# Patient Record
Sex: FEMALE | Race: WHITE | URBAN - METROPOLITAN AREA
[De-identification: names, ages, dates, MRNs, and addresses within clinical notes are randomized per-mention and may not be internally consistent; named-entity substitution may affect disease eponyms.]

---

## 2021-04-20 PROBLEM — M33.90 DERMATOMYOSITIS (HCC): Status: ACTIVE | Noted: 2021-04-20

## 2021-04-20 PROBLEM — F79 MENTAL IMPAIRMENT: Status: ACTIVE | Noted: 2021-04-20

## 2021-04-20 PROBLEM — E55.9 VITAMIN D DEFICIENCY: Status: ACTIVE | Noted: 2021-04-20

## 2021-04-20 PROBLEM — M33.13 DERMATOMYOSITIS (HCC): Status: ACTIVE | Noted: 2021-04-20

## 2021-04-20 NOTE — PROGRESS NOTES
Idalmis  is a 38year old female.   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

## 2021-05-03 ENCOUNTER — OFFICE VISIT (OUTPATIENT)
Dept: RHEUMATOLOGY | Facility: CLINIC | Age: 36
End: 2021-05-03
Payer: MEDICARE

## 2021-05-03 VITALS
DIASTOLIC BLOOD PRESSURE: 84 MMHG | WEIGHT: 118.13 LBS | SYSTOLIC BLOOD PRESSURE: 135 MMHG | HEART RATE: 91 BPM | BODY MASS INDEX: 21.74 KG/M2 | HEIGHT: 62 IN

## 2021-05-03 DIAGNOSIS — M33.90 DERMATOMYOSITIS (HCC): Primary | ICD-10-CM

## 2021-05-03 PROCEDURE — 99204 OFFICE O/P NEW MOD 45 MIN: CPT | Performed by: INTERNAL MEDICINE

## 2021-05-05 ENCOUNTER — LAB ENCOUNTER (OUTPATIENT)
Dept: LAB | Age: 36
End: 2021-05-05
Attending: INTERNAL MEDICINE
Payer: MEDICARE

## 2021-05-05 DIAGNOSIS — F79 MENTAL IMPAIRMENT: ICD-10-CM

## 2021-05-05 DIAGNOSIS — M33.90 DERMATOMYOSITIS (HCC): ICD-10-CM

## 2021-05-05 DIAGNOSIS — E55.9 VITAMIN D DEFICIENCY: ICD-10-CM

## 2021-05-05 DIAGNOSIS — Z13.6 ENCOUNTER FOR SCREENING FOR CORONARY ARTERY DISEASE: ICD-10-CM

## 2021-05-05 PROCEDURE — 82550 ASSAY OF CK (CPK): CPT

## 2021-05-05 PROCEDURE — 85027 COMPLETE CBC AUTOMATED: CPT

## 2021-05-05 PROCEDURE — 84443 ASSAY THYROID STIM HORMONE: CPT

## 2021-05-05 PROCEDURE — 36415 COLL VENOUS BLD VENIPUNCTURE: CPT

## 2021-05-05 PROCEDURE — 80061 LIPID PANEL: CPT

## 2021-05-05 PROCEDURE — 80053 COMPREHEN METABOLIC PANEL: CPT

## 2021-05-05 PROCEDURE — 82306 VITAMIN D 25 HYDROXY: CPT

## 2021-05-05 PROCEDURE — 82085 ASSAY OF ALDOLASE: CPT

## 2021-07-09 ENCOUNTER — OFFICE VISIT (OUTPATIENT)
Dept: OBGYN CLINIC | Facility: CLINIC | Age: 36
End: 2021-07-09
Payer: MEDICARE

## 2021-07-09 VITALS
WEIGHT: 116.63 LBS | HEART RATE: 86 BPM | DIASTOLIC BLOOD PRESSURE: 79 MMHG | SYSTOLIC BLOOD PRESSURE: 124 MMHG | BODY MASS INDEX: 21 KG/M2

## 2021-07-09 DIAGNOSIS — Z01.419 ENCOUNTER FOR GYNECOLOGICAL EXAMINATION WITHOUT ABNORMAL FINDING: Primary | ICD-10-CM

## 2021-07-09 PROCEDURE — G0101 CA SCREEN;PELVIC/BREAST EXAM: HCPCS | Performed by: OBSTETRICS & GYNECOLOGY

## 2021-07-09 NOTE — PROGRESS NOTES
Varinder Morrison is a 39year old female [facility] Patient's last menstrual period was 2021 (approximate). Patient presents with:  Gyn Exam: Early Luciano   .     Pt is mentally disabled and is here with her father.   Her m other recently  of breast denies shortness of breath  Gastrointestinal:  denies heartburn, abdominal pain, diarrhea or constipation  Genitourinary:  denies dysuria, incontinence, abnormal vaginal discharge, vaginal itching  Musculoskeletal:  denies back pain.   Skin/Breast:  Denies low risk and not sexually active.     Orders Placed This Encounter      Hpv Dna  High Risk , Thin Prep Collect      ThinPrep PAP Smear      Requested Prescriptions      No prescriptions requested or ordered in this encounter       None

## 2021-07-12 LAB — HPV I/H RISK 1 DNA SPEC QL NAA+PROBE: NEGATIVE

## 2021-10-11 ENCOUNTER — TELEPHONE (OUTPATIENT)
Dept: SCHEDULING | Age: 36
End: 2021-10-11

## 2021-10-12 ENCOUNTER — LAB SERVICES (OUTPATIENT)
Dept: URGENT CARE | Age: 36
End: 2021-10-12

## 2021-10-12 DIAGNOSIS — Z11.52 ENCOUNTER FOR SCREENING LABORATORY TESTING FOR COVID-19 VIRUS IN ASYMPTOMATIC PATIENT: Primary | ICD-10-CM

## 2021-10-12 DIAGNOSIS — Z01.812 ENCOUNTER FOR SCREENING LABORATORY TESTING FOR COVID-19 VIRUS IN ASYMPTOMATIC PATIENT: Primary | ICD-10-CM

## 2021-10-12 PROCEDURE — U0005 INFEC AGEN DETEC AMPLI PROBE: HCPCS | Performed by: CLINICAL MEDICAL LABORATORY

## 2021-10-12 PROCEDURE — U0003 INFECTIOUS AGENT DETECTION BY NUCLEIC ACID (DNA OR RNA); SEVERE ACUTE RESPIRATORY SYNDROME CORONAVIRUS 2 (SARS-COV-2) (CORONAVIRUS DISEASE [COVID-19]), AMPLIFIED PROBE TECHNIQUE, MAKING USE OF HIGH THROUGHPUT TECHNOLOGIES AS DESCRIBED BY CMS-2020-01-R: HCPCS | Performed by: CLINICAL MEDICAL LABORATORY

## 2021-10-13 LAB
SARS-COV-2 RNA RESP QL NAA+PROBE: NOT DETECTED
SERVICE CMNT-IMP: NORMAL
SERVICE CMNT-IMP: NORMAL

## 2021-12-13 ENCOUNTER — TELEPHONE (OUTPATIENT)
Dept: SCHEDULING | Age: 36
End: 2021-12-13

## 2021-12-14 ENCOUNTER — LAB SERVICES (OUTPATIENT)
Dept: URGENT CARE | Age: 36
End: 2021-12-14

## 2021-12-14 DIAGNOSIS — Z11.52 ENCOUNTER FOR SCREENING LABORATORY TESTING FOR COVID-19 VIRUS IN ASYMPTOMATIC PATIENT: Primary | ICD-10-CM

## 2021-12-14 DIAGNOSIS — Z01.812 ENCOUNTER FOR SCREENING LABORATORY TESTING FOR COVID-19 VIRUS IN ASYMPTOMATIC PATIENT: Primary | ICD-10-CM

## 2021-12-14 PROCEDURE — U0003 INFECTIOUS AGENT DETECTION BY NUCLEIC ACID (DNA OR RNA); SEVERE ACUTE RESPIRATORY SYNDROME CORONAVIRUS 2 (SARS-COV-2) (CORONAVIRUS DISEASE [COVID-19]), AMPLIFIED PROBE TECHNIQUE, MAKING USE OF HIGH THROUGHPUT TECHNOLOGIES AS DESCRIBED BY CMS-2020-01-R: HCPCS | Performed by: CLINICAL MEDICAL LABORATORY

## 2021-12-14 PROCEDURE — U0005 INFEC AGEN DETEC AMPLI PROBE: HCPCS | Performed by: CLINICAL MEDICAL LABORATORY

## 2021-12-15 LAB
SARS-COV-2 RNA RESP QL NAA+PROBE: NOT DETECTED
SERVICE CMNT-IMP: NORMAL
SERVICE CMNT-IMP: NORMAL

## 2022-04-06 ENCOUNTER — HOSPITAL ENCOUNTER (OUTPATIENT)
Dept: GENERAL RADIOLOGY | Facility: HOSPITAL | Age: 37
Discharge: HOME | End: 2022-04-06
Attending: INTERNAL MEDICINE
Payer: MEDICARE

## 2022-04-06 ENCOUNTER — OFFICE VISIT (OUTPATIENT)
Dept: INTERNAL MEDICINE CLINIC | Facility: CLINIC | Age: 37
End: 2022-04-06
Payer: MEDICARE

## 2022-04-06 VITALS
BODY MASS INDEX: 22.45 KG/M2 | SYSTOLIC BLOOD PRESSURE: 137 MMHG | DIASTOLIC BLOOD PRESSURE: 88 MMHG | WEIGHT: 122 LBS | HEART RATE: 74 BPM | RESPIRATION RATE: 16 BRPM | HEIGHT: 62 IN

## 2022-04-06 DIAGNOSIS — M79.602 LEFT ARM PAIN: ICD-10-CM

## 2022-04-06 DIAGNOSIS — Z80.0 FAMILY HISTORY OF COLON CANCER: ICD-10-CM

## 2022-04-06 DIAGNOSIS — E55.9 VITAMIN D DEFICIENCY: ICD-10-CM

## 2022-04-06 DIAGNOSIS — M25.512 CHRONIC LEFT SHOULDER PAIN: ICD-10-CM

## 2022-04-06 DIAGNOSIS — G89.29 CHRONIC LEFT SHOULDER PAIN: Primary | ICD-10-CM

## 2022-04-06 DIAGNOSIS — G89.29 CHRONIC LEFT SHOULDER PAIN: ICD-10-CM

## 2022-04-06 DIAGNOSIS — M25.512 CHRONIC LEFT SHOULDER PAIN: Primary | ICD-10-CM

## 2022-04-06 PROCEDURE — 99214 OFFICE O/P EST MOD 30 MIN: CPT | Performed by: INTERNAL MEDICINE

## 2022-04-06 PROCEDURE — 73060 X-RAY EXAM OF HUMERUS: CPT | Performed by: INTERNAL MEDICINE

## 2022-04-06 PROCEDURE — 73030 X-RAY EXAM OF SHOULDER: CPT | Performed by: INTERNAL MEDICINE

## 2022-04-06 RX ORDER — LIDOCAINE 50 MG/G
1 PATCH TOPICAL EVERY 24 HOURS
Qty: 15 EACH | Refills: 0 | Status: SHIPPED | OUTPATIENT
Start: 2022-04-06

## 2022-04-06 RX ORDER — ERGOCALCIFEROL 1.25 MG/1
50000 CAPSULE ORAL WEEKLY
Qty: 4 CAPSULE | Refills: 3 | Status: SHIPPED | OUTPATIENT
Start: 2022-04-06

## 2022-06-01 ENCOUNTER — TELEPHONE (OUTPATIENT)
Dept: SCHEDULING | Age: 37
End: 2022-06-01

## 2022-06-01 ENCOUNTER — OFFICE VISIT (OUTPATIENT)
Dept: URGENT CARE | Age: 37
End: 2022-06-01

## 2022-06-01 VITALS
DIASTOLIC BLOOD PRESSURE: 82 MMHG | BODY MASS INDEX: 21.16 KG/M2 | WEIGHT: 115 LBS | HEIGHT: 62 IN | HEART RATE: 94 BPM | SYSTOLIC BLOOD PRESSURE: 100 MMHG | RESPIRATION RATE: 20 BRPM | OXYGEN SATURATION: 95 % | TEMPERATURE: 98.3 F

## 2022-06-01 DIAGNOSIS — J02.0 ACUTE STREPTOCOCCAL PHARYNGITIS: Primary | ICD-10-CM

## 2022-06-01 DIAGNOSIS — J06.9 VIRAL UPPER RESPIRATORY TRACT INFECTION: ICD-10-CM

## 2022-06-01 DIAGNOSIS — J02.9 SORE THROAT: ICD-10-CM

## 2022-06-01 LAB
INTERNAL PROCEDURAL CONTROLS ACCEPTABLE: YES
S PYO AG THROAT QL IA.RAPID: POSITIVE
TEST LOT EXPIRATION DATE: ABNORMAL
TEST LOT NUMBER: ABNORMAL

## 2022-06-01 PROCEDURE — 87880 STREP A ASSAY W/OPTIC: CPT | Performed by: NURSE PRACTITIONER

## 2022-06-01 PROCEDURE — U0005 INFEC AGEN DETEC AMPLI PROBE: HCPCS | Performed by: CLINICAL MEDICAL LABORATORY

## 2022-06-01 PROCEDURE — U0003 INFECTIOUS AGENT DETECTION BY NUCLEIC ACID (DNA OR RNA); SEVERE ACUTE RESPIRATORY SYNDROME CORONAVIRUS 2 (SARS-COV-2) (CORONAVIRUS DISEASE [COVID-19]), AMPLIFIED PROBE TECHNIQUE, MAKING USE OF HIGH THROUGHPUT TECHNOLOGIES AS DESCRIBED BY CMS-2020-01-R: HCPCS | Performed by: CLINICAL MEDICAL LABORATORY

## 2022-06-01 PROCEDURE — 99203 OFFICE O/P NEW LOW 30 MIN: CPT | Performed by: NURSE PRACTITIONER

## 2022-06-01 RX ORDER — AMOXICILLIN 875 MG/1
875 TABLET, COATED ORAL 2 TIMES DAILY
Qty: 20 TABLET | Refills: 0 | Status: SHIPPED | OUTPATIENT
Start: 2022-06-01 | End: 2022-06-11

## 2022-06-01 RX ORDER — LIDOCAINE 50 MG/G
1 PATCH TOPICAL DAILY
COMMUNITY
Start: 2022-04-06

## 2022-06-01 ASSESSMENT — ENCOUNTER SYMPTOMS
COUGH: 1
FEVER: 0
CHILLS: 0
WHEEZING: 0
HEADACHES: 0
DIARRHEA: 0
SORE THROAT: 1
SHORTNESS OF BREATH: 0
TROUBLE SWALLOWING: 0
SWOLLEN GLANDS: 0

## 2022-06-02 ENCOUNTER — TELEPHONE (OUTPATIENT)
Dept: URGENT CARE | Age: 37
End: 2022-06-02

## 2022-06-02 LAB
SARS-COV-2 RNA RESP QL NAA+PROBE: DETECTED
SERVICE CMNT-IMP: ABNORMAL

## 2022-07-11 ENCOUNTER — LAB ENCOUNTER (OUTPATIENT)
Dept: LAB | Age: 37
End: 2022-07-11
Attending: INTERNAL MEDICINE
Payer: MEDICARE

## 2022-07-11 ENCOUNTER — OFFICE VISIT (OUTPATIENT)
Dept: INTERNAL MEDICINE CLINIC | Facility: CLINIC | Age: 37
End: 2022-07-11
Payer: MEDICARE

## 2022-07-11 VITALS
DIASTOLIC BLOOD PRESSURE: 50 MMHG | SYSTOLIC BLOOD PRESSURE: 114 MMHG | BODY MASS INDEX: 21.35 KG/M2 | HEART RATE: 85 BPM | WEIGHT: 116 LBS | HEIGHT: 62 IN

## 2022-07-11 DIAGNOSIS — H61.21 RIGHT EAR IMPACTED CERUMEN: ICD-10-CM

## 2022-07-11 DIAGNOSIS — F79 MENTAL IMPAIRMENT: ICD-10-CM

## 2022-07-11 DIAGNOSIS — M33.90 DERMATOMYOSITIS (HCC): ICD-10-CM

## 2022-07-11 DIAGNOSIS — Z00.00 ENCOUNTER FOR ANNUAL HEALTH EXAMINATION: Primary | ICD-10-CM

## 2022-07-11 DIAGNOSIS — Z83.42 FAMILY HISTORY OF FAMILIAL HYPERCHOLESTEROLEMIA: ICD-10-CM

## 2022-07-11 DIAGNOSIS — E55.9 VITAMIN D DEFICIENCY: ICD-10-CM

## 2022-07-11 DIAGNOSIS — Z00.00 ENCOUNTER FOR ANNUAL HEALTH EXAMINATION: ICD-10-CM

## 2022-07-11 LAB
ALBUMIN SERPL-MCNC: 3.7 G/DL (ref 3.4–5)
ALBUMIN/GLOB SERPL: 0.9 {RATIO} (ref 1–2)
ALP LIVER SERPL-CCNC: 84 U/L
ALT SERPL-CCNC: 15 U/L
ANION GAP SERPL CALC-SCNC: 9 MMOL/L (ref 0–18)
AST SERPL-CCNC: 10 U/L (ref 15–37)
BILIRUB SERPL-MCNC: 0.2 MG/DL (ref 0.1–2)
BUN BLD-MCNC: 13 MG/DL (ref 7–18)
BUN/CREAT SERPL: 18.3 (ref 10–20)
CALCIUM BLD-MCNC: 9.4 MG/DL (ref 8.5–10.1)
CHLORIDE SERPL-SCNC: 107 MMOL/L (ref 98–112)
CHOLEST SERPL-MCNC: 215 MG/DL (ref ?–200)
CO2 SERPL-SCNC: 26 MMOL/L (ref 21–32)
CREAT BLD-MCNC: 0.71 MG/DL
DEPRECATED RDW RBC AUTO: 47.4 FL (ref 35.1–46.3)
ERYTHROCYTE [DISTWIDTH] IN BLOOD BY AUTOMATED COUNT: 13.7 % (ref 11–15)
FASTING PATIENT LIPID ANSWER: YES
FASTING STATUS PATIENT QL REPORTED: YES
GLOBULIN PLAS-MCNC: 4.1 G/DL (ref 2.8–4.4)
GLUCOSE BLD-MCNC: 95 MG/DL (ref 70–99)
HCT VFR BLD AUTO: 39.9 %
HDLC SERPL-MCNC: 46 MG/DL (ref 40–59)
HGB BLD-MCNC: 12.6 G/DL
LDLC SERPL CALC-MCNC: 153 MG/DL (ref ?–100)
MCH RBC QN AUTO: 29.9 PG (ref 26–34)
MCHC RBC AUTO-ENTMCNC: 31.6 G/DL (ref 31–37)
MCV RBC AUTO: 94.8 FL
NONHDLC SERPL-MCNC: 169 MG/DL (ref ?–130)
OSMOLALITY SERPL CALC.SUM OF ELEC: 294 MOSM/KG (ref 275–295)
PLATELET # BLD AUTO: 379 10(3)UL (ref 150–450)
POTASSIUM SERPL-SCNC: 4.1 MMOL/L (ref 3.5–5.1)
PROT SERPL-MCNC: 7.8 G/DL (ref 6.4–8.2)
RBC # BLD AUTO: 4.21 X10(6)UL
SODIUM SERPL-SCNC: 142 MMOL/L (ref 136–145)
TRIGL SERPL-MCNC: 87 MG/DL (ref 30–149)
TSI SER-ACNC: 0.39 MIU/ML (ref 0.36–3.74)
VIT D+METAB SERPL-MCNC: 11.9 NG/ML (ref 30–100)
VLDLC SERPL CALC-MCNC: 17 MG/DL (ref 0–30)
WBC # BLD AUTO: 8.3 X10(3) UL (ref 4–11)

## 2022-07-11 PROCEDURE — 82306 VITAMIN D 25 HYDROXY: CPT

## 2022-07-11 PROCEDURE — 84443 ASSAY THYROID STIM HORMONE: CPT

## 2022-07-11 PROCEDURE — G0439 PPPS, SUBSEQ VISIT: HCPCS | Performed by: INTERNAL MEDICINE

## 2022-07-11 PROCEDURE — 80053 COMPREHEN METABOLIC PANEL: CPT

## 2022-07-11 PROCEDURE — 36415 COLL VENOUS BLD VENIPUNCTURE: CPT

## 2022-07-11 PROCEDURE — 85027 COMPLETE CBC AUTOMATED: CPT

## 2022-07-11 PROCEDURE — 80061 LIPID PANEL: CPT

## 2022-07-13 RX ORDER — ERGOCALCIFEROL 1.25 MG/1
50000 CAPSULE ORAL WEEKLY
Qty: 4 CAPSULE | Refills: 3 | Status: SHIPPED | OUTPATIENT
Start: 2022-07-13

## 2022-07-16 ENCOUNTER — IMAGING SERVICES (OUTPATIENT)
Dept: GENERAL RADIOLOGY | Age: 37
End: 2022-07-16
Attending: NURSE PRACTITIONER

## 2022-07-16 ENCOUNTER — WALK IN (OUTPATIENT)
Dept: URGENT CARE | Age: 37
End: 2022-07-16

## 2022-07-16 VITALS
SYSTOLIC BLOOD PRESSURE: 137 MMHG | HEART RATE: 85 BPM | TEMPERATURE: 98.3 F | DIASTOLIC BLOOD PRESSURE: 82 MMHG | OXYGEN SATURATION: 97 % | BODY MASS INDEX: 21.28 KG/M2 | HEIGHT: 62 IN | WEIGHT: 115.63 LBS

## 2022-07-16 DIAGNOSIS — S99.921A INJURY OF TOE ON RIGHT FOOT, INITIAL ENCOUNTER: ICD-10-CM

## 2022-07-16 DIAGNOSIS — S99.921A INJURY OF TOE ON RIGHT FOOT, INITIAL ENCOUNTER: Primary | ICD-10-CM

## 2022-07-16 DIAGNOSIS — S90.121A CONTUSION OF FIFTH TOE OF RIGHT FOOT, INITIAL ENCOUNTER: ICD-10-CM

## 2022-07-16 PROCEDURE — 73630 X-RAY EXAM OF FOOT: CPT | Performed by: NURSE PRACTITIONER

## 2022-07-16 PROCEDURE — 99213 OFFICE O/P EST LOW 20 MIN: CPT | Performed by: NURSE PRACTITIONER

## 2022-07-16 ASSESSMENT — ENCOUNTER SYMPTOMS
CHEST TIGHTNESS: 0
FEVER: 0
RESPIRATORY NEGATIVE: 1
LOSS OF SENSATION: 0
SHORTNESS OF BREATH: 0
GASTROINTESTINAL NEGATIVE: 1
SINUS PAIN: 0
HEADACHES: 0
CONSTITUTIONAL NEGATIVE: 1
SINUS PRESSURE: 0
EYES NEGATIVE: 1
HEMATOLOGIC/LYMPHATIC NEGATIVE: 1
WHEEZING: 0
COLOR CHANGE: 0
TINGLING: 0
ABDOMINAL DISTENTION: 0
ENDOCRINE NEGATIVE: 1
WEAKNESS: 0
VOICE CHANGE: 0
FATIGUE: 0
PSYCHIATRIC NEGATIVE: 1
NUMBNESS: 0
DIZZINESS: 0
STRIDOR: 0
SORE THROAT: 0
COUGH: 0
RHINORRHEA: 0
ALLERGIC/IMMUNOLOGIC NEGATIVE: 1
ACTIVITY CHANGE: 0
TROUBLE SWALLOWING: 0

## 2022-09-19 ENCOUNTER — NURSE TRIAGE (OUTPATIENT)
Dept: INTERNAL MEDICINE CLINIC | Facility: CLINIC | Age: 37
End: 2022-09-19

## 2022-09-19 ENCOUNTER — OFFICE VISIT (OUTPATIENT)
Dept: INTERNAL MEDICINE CLINIC | Facility: CLINIC | Age: 37
End: 2022-09-19
Payer: MEDICARE

## 2022-09-19 VITALS
DIASTOLIC BLOOD PRESSURE: 74 MMHG | HEART RATE: 86 BPM | WEIGHT: 119 LBS | RESPIRATION RATE: 16 BRPM | SYSTOLIC BLOOD PRESSURE: 110 MMHG | BODY MASS INDEX: 21.9 KG/M2 | HEIGHT: 62 IN

## 2022-09-19 DIAGNOSIS — Z12.11 COLON CANCER SCREENING: Primary | ICD-10-CM

## 2022-09-19 DIAGNOSIS — K62.5 RECTAL BLEEDING: ICD-10-CM

## 2022-09-19 DIAGNOSIS — Z80.0 FAMILY HISTORY OF COLON CANCER: ICD-10-CM

## 2022-09-19 NOTE — PATIENT INSTRUCTIONS
Increase fiber intake    Increase water intake     Take colace or a stool softner as needed for constipation

## 2022-12-03 ENCOUNTER — OFFICE VISIT (OUTPATIENT)
Dept: INTERNAL MEDICINE CLINIC | Facility: CLINIC | Age: 37
End: 2022-12-03
Payer: MEDICARE

## 2022-12-03 VITALS
DIASTOLIC BLOOD PRESSURE: 80 MMHG | WEIGHT: 127 LBS | BODY MASS INDEX: 23.37 KG/M2 | HEART RATE: 90 BPM | TEMPERATURE: 99 F | HEIGHT: 62 IN | OXYGEN SATURATION: 95 % | SYSTOLIC BLOOD PRESSURE: 118 MMHG

## 2022-12-03 DIAGNOSIS — F79 MENTAL IMPAIRMENT: ICD-10-CM

## 2022-12-03 DIAGNOSIS — Z02.5 SPORTS PHYSICAL: Primary | ICD-10-CM

## 2023-02-09 ENCOUNTER — OFFICE VISIT (OUTPATIENT)
Dept: INTERNAL MEDICINE CLINIC | Facility: CLINIC | Age: 38
End: 2023-02-09

## 2023-02-09 VITALS
HEIGHT: 62 IN | WEIGHT: 126 LBS | OXYGEN SATURATION: 97 % | SYSTOLIC BLOOD PRESSURE: 110 MMHG | BODY MASS INDEX: 23.19 KG/M2 | DIASTOLIC BLOOD PRESSURE: 70 MMHG | HEART RATE: 67 BPM

## 2023-02-09 DIAGNOSIS — Z80.3 FAMILY HISTORY OF BREAST CANCER: ICD-10-CM

## 2023-02-09 DIAGNOSIS — N64.4 BREAST PAIN, LEFT: Primary | ICD-10-CM

## 2023-02-09 PROCEDURE — 99214 OFFICE O/P EST MOD 30 MIN: CPT | Performed by: PHYSICIAN ASSISTANT

## 2023-02-15 ENCOUNTER — TELEPHONE (OUTPATIENT)
Dept: INTERNAL MEDICINE CLINIC | Facility: CLINIC | Age: 38
End: 2023-02-15

## 2023-02-15 ENCOUNTER — OFFICE VISIT (OUTPATIENT)
Dept: INTERNAL MEDICINE CLINIC | Facility: CLINIC | Age: 38
End: 2023-02-15

## 2023-02-15 VITALS
RESPIRATION RATE: 18 BRPM | TEMPERATURE: 99 F | BODY MASS INDEX: 23.37 KG/M2 | SYSTOLIC BLOOD PRESSURE: 112 MMHG | DIASTOLIC BLOOD PRESSURE: 68 MMHG | OXYGEN SATURATION: 99 % | WEIGHT: 127 LBS | HEART RATE: 74 BPM | HEIGHT: 62 IN

## 2023-02-15 DIAGNOSIS — N64.4 BREAST PAIN, LEFT: Primary | ICD-10-CM

## 2023-02-15 PROCEDURE — 99213 OFFICE O/P EST LOW 20 MIN: CPT | Performed by: PHYSICIAN ASSISTANT

## 2023-02-15 RX ORDER — SULFAMETHOXAZOLE AND TRIMETHOPRIM 800; 160 MG/1; MG/1
1 TABLET ORAL 2 TIMES DAILY
Qty: 6 TABLET | Refills: 0 | Status: SHIPPED | OUTPATIENT
Start: 2023-02-15 | End: 2023-02-18

## 2023-02-15 NOTE — TELEPHONE ENCOUNTER
D/w PA who saw pt --did not look red and only mid discomfort at that time , if it has gotten worse if might not be a bad idea to get it looked at [facility] -- RENETTA smith is here today and has openings and I ma here as well to pop my head in --pls see if they are avail to come in today

## 2023-02-15 NOTE — TELEPHONE ENCOUNTER
Patient father  calling  On ESTRELLA ( identified name and  )  Patient is having left  breast pain where breast lump is located    Last office visit 2023    Has been taking Advil with no relief     Area is very tender to touch, patient is not very verbal and is expressing pain to her dad      ( Father concerned as patient's  mother  of breast cancer )     Has Mammogram next week,father asking for short term pain med until then    Allergies reviewed and pharmacy confirmed    Please advise and thank you.       Future Appointments   Date Time Provider Evangelina Dumont   2023 10:40 AM [facility] [address] [facility] EM [address]   2023 11:00 AM Michelle Leigh MD [facility]             Staff-  please call Brad East  at  [phone] with provider response

## 2023-02-15 NOTE — TELEPHONE ENCOUNTER
Spoke with patient father Anita Berry , (  Name and  verified ) informed of 's  instructions below    Future Appointments   Date Time Provider Evangelina Dumont   2/15/2023  4:20 PM Yao Currie [facility]   2023 10:40 AM [facility]   2023 11:00 AM Vicenta Rosen MD [facility]

## 2023-02-22 ENCOUNTER — HOSPITAL ENCOUNTER (OUTPATIENT)
Dept: MAMMOGRAPHY | Facility: HOSPITAL | Age: 38
Discharge: HOME | End: 2023-02-22
Attending: PHYSICIAN ASSISTANT
Payer: MEDICARE

## 2023-02-22 ENCOUNTER — HOSPITAL ENCOUNTER (OUTPATIENT)
Dept: ULTRASOUND IMAGING | Facility: HOSPITAL | Age: 38
Discharge: HOME | End: 2023-02-22
Attending: PHYSICIAN ASSISTANT
Payer: MEDICARE

## 2023-02-22 DIAGNOSIS — N64.4 BREAST PAIN, LEFT: ICD-10-CM

## 2023-02-22 PROCEDURE — 77062 BREAST TOMOSYNTHESIS BI: CPT | Performed by: PHYSICIAN ASSISTANT

## 2023-02-22 PROCEDURE — 77066 DX MAMMO INCL CAD BI: CPT | Performed by: PHYSICIAN ASSISTANT

## 2023-02-22 PROCEDURE — 76642 ULTRASOUND BREAST LIMITED: CPT | Performed by: PHYSICIAN ASSISTANT

## 2023-02-23 ENCOUNTER — TELEPHONE (OUTPATIENT)
Dept: INTERNAL MEDICINE CLINIC | Facility: CLINIC | Age: 38
End: 2023-02-23

## 2023-02-23 DIAGNOSIS — R92.8 ABNORMAL MAMMOGRAM: Primary | ICD-10-CM

## 2023-02-23 NOTE — TELEPHONE ENCOUNTER
JORGE JACKSON:  Please note, father called for results. He seems worried only because patient is special needs and during the mammogram procedures, he was not allowed in the exam room. He would like to be notified with the results. He is on ESTRELLA.

## 2023-05-03 ENCOUNTER — TELEPHONE (OUTPATIENT)
Dept: INTERNAL MEDICINE CLINIC | Facility: CLINIC | Age: 38
End: 2023-05-03

## 2023-05-03 NOTE — TELEPHONE ENCOUNTER
Looked in CE - not able to see labs --pls assist with apt - telemedicine is ok if dad is ok with that

## 2023-05-03 NOTE — TELEPHONE ENCOUNTER
Call saida Brush whether Dr Mariane Cranker can see ER test results from 4-29 @ [facility].

## 2023-05-05 ENCOUNTER — TELEPHONE (OUTPATIENT)
Dept: INTERNAL MEDICINE CLINIC | Facility: CLINIC | Age: 38
End: 2023-05-05

## 2023-05-05 ENCOUNTER — MED REC SCAN ONLY (OUTPATIENT)
Dept: INTERNAL MEDICINE CLINIC | Facility: CLINIC | Age: 38
End: 2023-05-05

## 2023-05-05 NOTE — TELEPHONE ENCOUNTER
Laxmi Sparrow from [employer] states she faxed patients medical records asking if office received them. Please call back to confirm if received.     Ph# [phone]

## 2023-05-09 ENCOUNTER — OFFICE VISIT (OUTPATIENT)
Dept: INTERNAL MEDICINE CLINIC | Facility: CLINIC | Age: 38
End: 2023-05-09

## 2023-05-09 ENCOUNTER — LAB ENCOUNTER (OUTPATIENT)
Dept: LAB | Age: 38
End: 2023-05-09
Attending: INTERNAL MEDICINE
Payer: MEDICARE

## 2023-05-09 VITALS
WEIGHT: 123.81 LBS | DIASTOLIC BLOOD PRESSURE: 62 MMHG | HEART RATE: 84 BPM | SYSTOLIC BLOOD PRESSURE: 108 MMHG | RESPIRATION RATE: 18 BRPM | OXYGEN SATURATION: 100 % | BODY MASS INDEX: 22.78 KG/M2 | HEIGHT: 62 IN

## 2023-05-09 DIAGNOSIS — Z83.42 FAMILY HISTORY OF FAMILIAL HYPERCHOLESTEROLEMIA: ICD-10-CM

## 2023-05-09 DIAGNOSIS — M33.90 DERMATOMYOSITIS (HCC): ICD-10-CM

## 2023-05-09 DIAGNOSIS — F79 MENTAL IMPAIRMENT: ICD-10-CM

## 2023-05-09 DIAGNOSIS — E55.9 VITAMIN D DEFICIENCY: ICD-10-CM

## 2023-05-09 DIAGNOSIS — I77.1 CELIAC ARTERY STENOSIS (HCC): Primary | ICD-10-CM

## 2023-05-09 PROBLEM — Z80.3 FAMILY HISTORY OF BREAST CANCER IN MOTHER: Status: ACTIVE | Noted: 2023-05-09

## 2023-05-09 LAB
CHOLEST SERPL-MCNC: 245 MG/DL (ref ?–200)
FASTING PATIENT LIPID ANSWER: YES
HDLC SERPL-MCNC: 50 MG/DL (ref 40–59)
LDLC SERPL CALC-MCNC: 169 MG/DL (ref ?–100)
NONHDLC SERPL-MCNC: 195 MG/DL (ref ?–130)
TRIGL SERPL-MCNC: 141 MG/DL (ref 30–149)
TSI SER-ACNC: 0.48 MIU/ML (ref 0.36–3.74)
VIT D+METAB SERPL-MCNC: 10.8 NG/ML (ref 30–100)
VLDLC SERPL CALC-MCNC: 28 MG/DL (ref 0–30)

## 2023-05-09 PROCEDURE — 84443 ASSAY THYROID STIM HORMONE: CPT

## 2023-05-09 PROCEDURE — 80061 LIPID PANEL: CPT

## 2023-05-09 PROCEDURE — 99214 OFFICE O/P EST MOD 30 MIN: CPT | Performed by: INTERNAL MEDICINE

## 2023-05-09 PROCEDURE — 82306 VITAMIN D 25 HYDROXY: CPT

## 2023-05-09 PROCEDURE — 36415 COLL VENOUS BLD VENIPUNCTURE: CPT

## 2023-05-09 RX ORDER — ERGOCALCIFEROL 1.25 MG/1
50000 CAPSULE ORAL WEEKLY
Qty: 4 CAPSULE | Refills: 3 | Status: CANCELLED | OUTPATIENT
Start: 2023-05-09

## 2023-05-14 DIAGNOSIS — E78.2 MIXED HYPERLIPIDEMIA: Primary | ICD-10-CM

## 2023-05-14 RX ORDER — ERGOCALCIFEROL 1.25 MG/1
50000 CAPSULE ORAL WEEKLY
Qty: 4 CAPSULE | Refills: 3 | Status: SHIPPED | OUTPATIENT
Start: 2023-05-14

## 2023-05-14 RX ORDER — ATORVASTATIN CALCIUM 20 MG/1
20 TABLET, FILM COATED ORAL NIGHTLY
Qty: 90 TABLET | Refills: 3 | Status: SHIPPED | OUTPATIENT
Start: 2023-05-14

## 2023-06-14 ENCOUNTER — OFFICE VISIT (OUTPATIENT)
Dept: SURGERY | Facility: CLINIC | Age: 38
End: 2023-06-14
Payer: MEDICARE

## 2023-06-14 VITALS
OXYGEN SATURATION: 99 % | HEART RATE: 73 BPM | SYSTOLIC BLOOD PRESSURE: 136 MMHG | RESPIRATION RATE: 16 BRPM | DIASTOLIC BLOOD PRESSURE: 85 MMHG

## 2023-06-14 DIAGNOSIS — R92.8 ABNORMAL MAMMOGRAM OF RIGHT BREAST: Primary | ICD-10-CM

## 2023-06-14 PROCEDURE — 99204 OFFICE O/P NEW MOD 45 MIN: CPT | Performed by: SURGERY

## 2023-06-16 PROBLEM — R92.8 ABNORMAL MAMMOGRAM OF RIGHT BREAST: Status: ACTIVE | Noted: 2023-06-16

## 2023-07-31 ENCOUNTER — HOSPITAL ENCOUNTER (EMERGENCY)
Facility: HOSPITAL | Age: 38
Discharge: HOME | End: 2023-07-31
Attending: EMERGENCY MEDICINE
Payer: MEDICARE

## 2023-07-31 VITALS
HEART RATE: 88 BPM | OXYGEN SATURATION: 100 % | TEMPERATURE: 98 F | DIASTOLIC BLOOD PRESSURE: 75 MMHG | RESPIRATION RATE: 18 BRPM | SYSTOLIC BLOOD PRESSURE: 131 MMHG

## 2023-07-31 DIAGNOSIS — R11.2 NAUSEA AND VOMITING IN ADULT: Primary | ICD-10-CM

## 2023-07-31 LAB
ALBUMIN SERPL-MCNC: 3.9 G/DL (ref 3.4–5)
ALBUMIN/GLOB SERPL: 0.9 {RATIO} (ref 1–2)
ALP LIVER SERPL-CCNC: 80 U/L
ALT SERPL-CCNC: 20 U/L
ANION GAP SERPL CALC-SCNC: 9 MMOL/L (ref 0–18)
AST SERPL-CCNC: 19 U/L (ref 15–37)
BASOPHILS # BLD AUTO: 0.02 X10(3) UL (ref 0–0.2)
BASOPHILS NFR BLD AUTO: 0.1 %
BILIRUB SERPL-MCNC: 0.3 MG/DL (ref 0.1–2)
BUN BLD-MCNC: 7 MG/DL (ref 7–18)
BUN/CREAT SERPL: 9 (ref 10–20)
CALCIUM BLD-MCNC: 9.3 MG/DL (ref 8.5–10.1)
CHLORIDE SERPL-SCNC: 108 MMOL/L (ref 98–112)
CO2 SERPL-SCNC: 24 MMOL/L (ref 21–32)
CREAT BLD-MCNC: 0.78 MG/DL
DEPRECATED RDW RBC AUTO: 45.6 FL (ref 35.1–46.3)
EGFRCR SERPLBLD CKD-EPI 2021: 99 ML/MIN/1.73M2 (ref 60–?)
EOSINOPHIL # BLD AUTO: 0.03 X10(3) UL (ref 0–0.7)
EOSINOPHIL NFR BLD AUTO: 0.2 %
ERYTHROCYTE [DISTWIDTH] IN BLOOD BY AUTOMATED COUNT: 14.2 % (ref 11–15)
GLOBULIN PLAS-MCNC: 4.2 G/DL (ref 2.8–4.4)
GLUCOSE BLD-MCNC: 136 MG/DL (ref 70–99)
HCT VFR BLD AUTO: 36.8 %
HGB BLD-MCNC: 12 G/DL
IMM GRANULOCYTES # BLD AUTO: 0.06 X10(3) UL (ref 0–1)
IMM GRANULOCYTES NFR BLD: 0.4 %
LIPASE SERPL-CCNC: 40 U/L (ref 13–75)
LYMPHOCYTES # BLD AUTO: 2.63 X10(3) UL (ref 1–4)
LYMPHOCYTES NFR BLD AUTO: 16.2 %
MCH RBC QN AUTO: 29 PG (ref 26–34)
MCHC RBC AUTO-ENTMCNC: 32.6 G/DL (ref 31–37)
MCV RBC AUTO: 88.9 FL
MONOCYTES # BLD AUTO: 0.89 X10(3) UL (ref 0.1–1)
MONOCYTES NFR BLD AUTO: 5.5 %
NEUTROPHILS # BLD AUTO: 12.59 X10 (3) UL (ref 1.5–7.7)
NEUTROPHILS # BLD AUTO: 12.59 X10(3) UL (ref 1.5–7.7)
NEUTROPHILS NFR BLD AUTO: 77.6 %
OSMOLALITY SERPL CALC.SUM OF ELEC: 292 MOSM/KG (ref 275–295)
PLATELET # BLD AUTO: 333 10(3)UL (ref 150–450)
POTASSIUM SERPL-SCNC: 3.6 MMOL/L (ref 3.5–5.1)
PROT SERPL-MCNC: 8.1 G/DL (ref 6.4–8.2)
RBC # BLD AUTO: 4.14 X10(6)UL
SODIUM SERPL-SCNC: 141 MMOL/L (ref 136–145)
WBC # BLD AUTO: 16.2 X10(3) UL (ref 4–11)

## 2023-07-31 PROCEDURE — 80053 COMPREHEN METABOLIC PANEL: CPT | Performed by: EMERGENCY MEDICINE

## 2023-07-31 PROCEDURE — S0028 INJECTION, FAMOTIDINE, 20 MG: HCPCS | Performed by: EMERGENCY MEDICINE

## 2023-07-31 PROCEDURE — 96361 HYDRATE IV INFUSION ADD-ON: CPT

## 2023-07-31 PROCEDURE — 96375 TX/PRO/DX INJ NEW DRUG ADDON: CPT

## 2023-07-31 PROCEDURE — 85025 COMPLETE CBC W/AUTO DIFF WBC: CPT | Performed by: EMERGENCY MEDICINE

## 2023-07-31 PROCEDURE — 83690 ASSAY OF LIPASE: CPT | Performed by: EMERGENCY MEDICINE

## 2023-07-31 PROCEDURE — 96374 THER/PROPH/DIAG INJ IV PUSH: CPT

## 2023-07-31 PROCEDURE — 99284 EMERGENCY DEPT VISIT MOD MDM: CPT

## 2023-07-31 RX ORDER — FAMOTIDINE 10 MG/ML
20 INJECTION, SOLUTION INTRAVENOUS ONCE
Status: COMPLETED | OUTPATIENT
Start: 2023-07-31 | End: 2023-07-31

## 2023-07-31 RX ORDER — ONDANSETRON 4 MG/1
4 TABLET, ORALLY DISINTEGRATING ORAL EVERY 8 HOURS PRN
Qty: 30 TABLET | Refills: 0 | Status: SHIPPED | OUTPATIENT
Start: 2023-07-31 | End: 2023-08-07

## 2023-07-31 RX ORDER — ONDANSETRON 2 MG/ML
4 INJECTION INTRAMUSCULAR; INTRAVENOUS ONCE
Status: COMPLETED | OUTPATIENT
Start: 2023-07-31 | End: 2023-07-31

## 2023-07-31 NOTE — ED INITIAL ASSESSMENT (HPI)
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 Pt is seeing dr. Jd Cheatham and had a ct angiography of her mesenteric arteries last month and is scheduled for an ultrasound on 8/3. Pt is having abdominal pain and vomiting that started this evening. B: see above    A: pt is non toxic appearing.  There appears to be some developmental delays    R: protocol

## 2023-08-02 ENCOUNTER — PATIENT OUTREACH (OUTPATIENT)
Dept: CASE MANAGEMENT | Age: 38
End: 2023-08-02

## 2023-08-02 NOTE — PROGRESS NOTES
1st attempt ED f/up apt request   No answer, LVMTCB to schedule apts Bilobed Transposition Flap Text: The defect edges were debeveled with a #15 scalpel blade.  Given the location of the defect and the proximity to free margins a bilobed transposition flap was deemed most appropriate.  Using a sterile surgical marker, an appropriate bilobe flap drawn around the defect.    The area thus outlined was incised deep to adipose tissue with a #15 scalpel blade.  The skin margins were undermined to an appropriate distance in all directions utilizing iris scissors.

## 2023-08-04 NOTE — PROGRESS NOTES
3rd attempt ED f/up apt request     Spoke to pts father Troy Ty and he would like to wait till pt has her other apts before scheduling w/her PCP.

## 2023-08-16 ENCOUNTER — OFFICE VISIT (OUTPATIENT)
Dept: INTERNAL MEDICINE CLINIC | Facility: CLINIC | Age: 38
End: 2023-08-16

## 2023-08-16 VITALS
DIASTOLIC BLOOD PRESSURE: 58 MMHG | SYSTOLIC BLOOD PRESSURE: 116 MMHG | WEIGHT: 130 LBS | HEART RATE: 74 BPM | HEIGHT: 62 IN | BODY MASS INDEX: 23.92 KG/M2 | OXYGEN SATURATION: 100 %

## 2023-08-16 DIAGNOSIS — R11.0 NAUSEA: Primary | ICD-10-CM

## 2023-08-16 DIAGNOSIS — K21.9 GASTROESOPHAGEAL REFLUX DISEASE, UNSPECIFIED WHETHER ESOPHAGITIS PRESENT: ICD-10-CM

## 2023-08-16 PROCEDURE — 99213 OFFICE O/P EST LOW 20 MIN: CPT

## 2023-08-16 RX ORDER — ONDANSETRON 4 MG/1
4 TABLET, ORALLY DISINTEGRATING ORAL EVERY 8 HOURS PRN
Qty: 30 TABLET | Refills: 0 | Status: SHIPPED | OUTPATIENT
Start: 2023-08-16

## 2023-08-16 RX ORDER — ONDANSETRON 4 MG/1
4 TABLET, ORALLY DISINTEGRATING ORAL EVERY 8 HOURS PRN
COMMUNITY
End: 2023-08-16

## 2023-08-23 ENCOUNTER — HOSPITAL ENCOUNTER (OUTPATIENT)
Dept: MAMMOGRAPHY | Facility: HOSPITAL | Age: 38
Discharge: HOME | End: 2023-08-23
Attending: SURGERY
Payer: MEDICARE

## 2023-08-23 DIAGNOSIS — R92.8 ABNORMAL MAMMOGRAM OF RIGHT BREAST: ICD-10-CM

## 2023-08-23 PROCEDURE — 77065 DX MAMMO INCL CAD UNI: CPT | Performed by: SURGERY

## 2023-08-23 PROCEDURE — 77061 BREAST TOMOSYNTHESIS UNI: CPT | Performed by: SURGERY

## 2023-09-05 NOTE — H&P
[address] [facility]                                                                                                               Reason for consult: nausea    Requesting physician or provider: Yung Marcos MD    Patient presents with:  Abdominal Pain  Vomiting  Other: Low on Pepcid prescription and possibly need a refill. HPI:   Krista Houser is a 43year old year-old female with medical history including juvenile dermatomyositis, cognitive developmental delay. Pt is accompanied by Dean Latham, to clinic visit. she is here today for evaluation of GERD. -intermittent nausea for about 8 months  -intermittent bad taste in her mouth  -reports occasional abdominal cramping that gets better when she has a bowel movement    Pt seen in ED on 7/31/23 for nausea & vomiting after drinking a beer. Pt discharged with ondansetron for nausea which has been helping. They saw Dr. Wilbert Garland in June with vascular surgery and had CT and Mesenteric Artery Duplex Evaluation for median arcuate ligament syndrome. They were told by her office that nothing further was needed from vascular surgery standpoint. Patient denies symptoms of vomiting, dyspepsia, dysphagia, odynophagia, globus sensation, heartburn, hematemesis, change in bowel habits, constipation, diarrhea, hematochezia, or melena. she denies recent change in appetite, fever or unintentional weight loss. she moves her bowels 1 times per day or every other day and without recent change. she denies straining and/or incomplete evacuation.       Last colonoscopy: none   Last EGD: none     NSAIDS: advil prn rare  Tobacco:none   Alcohol: occasional   Marijuana:none   Illicit drugs:none     No FH GI malignancy: none (Maternal grandma & Dad had polyps)    No history of adverse reaction to sedation  No HERMAN  No anticoagulants  No pacemaker/defibrillator  No pain medications and/or sleep aides    Wt Readings from Last 6 Encounters:  09/08/23 : 127 lb (57.6 kg)  08/16/23 : 130 lb (59 kg)  05/09/23 : 123 lb 12.8 oz (56.2 kg)  02/15/23 : 127 lb (57.6 kg)  02/09/23 : 126 lb (57.2 kg)  12/03/22 : 127 lb (57.6 kg)       History, Medications, Allergies, ROS:      Past Medical History:   Diagnosis Date    Cognitive developmental delay     JDMS (juvenile dermatomyositis) ([facility] [address]     age 9, now in remission      Past Surgical History:   Procedure Laterality Date    DEEP MUSCLE BIOPSY  1991    thigh      Family Hx:   Family History   Problem Relation Age of Onset    Breast Cancer Mother         breast ca    Diabetes Father     Lipids Father     Depression Brother     Stroke Maternal Grandmother     Colon Cancer Neg     Ovarian Cancer Neg       Social History:   Social History     Socioeconomic History    Marital status: Single   Tobacco Use    Smoking status: Never     Passive exposure: Never    Smokeless tobacco: Never   Vaping Use    Vaping Use: Never used   Substance and Sexual Activity    Alcohol use: Never    Drug use: Never        Medications (Active prior to today's visit):  Current Outpatient Medications   Medication Sig Dispense Refill    ondansetron 4 MG Oral Tablet Dispersible Take 1 tablet (4 mg total) by mouth every 8 (eight) hours as needed for Nausea. 30 tablet 0    ergocalciferol 1.25 MG (86591 UT) Oral Cap Take 1 capsule (50,000 Units total) by mouth once a week. 4 capsule 3    ergocalciferol 1.25 MG (53452 UT) Oral Cap Take 1 capsule (50,000 Units total) by mouth once a week.  4 capsule 3       Allergies:    Amoxicillin             HIVES    ROS:   CONSTITUTIONAL: negative for fevers, chills, sweats and weight loss  EYES Negative for scleral icterus or redness, and diplopia  HEENT: Negative for dysphagia and hoarseness  RESPIRATORY: Negative for cough and severe shortness of breath  CARDIOVASCULAR: Negative for crushing sub-sternal chest pain  GASTROINTESTINAL: See HPI  GENITOURINARY: Negative for dysuria  MUSCULOSKELETAL: Negative for arthralgias and myalgias  SKIN: Negative for jaundice, rash or pruritus  NEUROLOGICAL: Negative for dizziness and headaches  BEHAVIOR/PSYCH: Negative for psychotic behavior and poor appetite    PHYSICAL EXAM:   Blood pressure 116/74, pulse 90, height 5' 2\" (1.575 m), weight 127 lb (57.6 kg), last menstrual period 08/24/2023. GEN: Alert, no acute distress, well-nourished   HEENT: anicteric sclera, neck supple, trachea midline, MMM, no palpable or tender neck or supraclavicular lymph nodes  CV: RRR, the extremities are warm and well perfused   LUNGS: No increased work of breathing, CTAB  ABDOMEN: Soft, symmetrical, non-tender without distention or guarding. No scars or lesions. Aorta is without bruit or visible pulsation. Umbilicus is midline without herniation. Normoactive bowel sounds are present, No masses, hepatomegaly or splenomegaly noted. RECTAL: +2 external hemorrhoids without erythema, edema or tenderness, light pink drainage when wiped with gauze. Normal rectal sphincter tone. No external masses or lesions. No palpable internal mass or lesion. No tenderness. MSK: No erythema, no warmth, no swelling of joints  SKIN: No jaundice, no erythema, no rashes, no lesions  HEMATOLOGIC: No bleeding, no bruising  NEURO: Alert and interactive, MIRZA  PSYCH: appropriate mood & affect    Labs/Imaging/Procedures:     Patient's pertinent labs and imaging were reviewed and discussed with patient today. .  ASSESSMENT/PLAN:   Aparna Burt is a 43year old year-old female with medical history including juvenile dermatomyositis, cognitive developmental delay. Pt is accompanied by Humble Batista, to clinic visit.     #GERD  -intermittent nausea for about 8 months  -intermittent bad taste in her mouth  -reports occasional abdominal cramping that gets better when she has a bowel movement  -unremarkable abd physical exam  -pt reports 1 episode of bright red blood drops in toilet   -+external hemorrhoid on rectal exam with light pink drainage when wiped with gauze    Recommendations:  -can start fiber supplement daily  -increase physical activity, water intake & dietary fiber  -use over the counter hemorrhoid cream (hydrocortisone cream, such as preparation H) for 1 week at a time during flares  -can do sitz bath during flare  -use wet wipes (baby wipes) instead of toilet paper   -avoid sitting on toilet for long periods of time and avoid straining  -STOP taking ondansetron daily, ONLY take occasionally as needed for severe nausea/vomiting  -START taking famotidine (pepcid) 20mg once daily about 30 minutes before meal, you CAN increase to twice daily if needed (once before breakfast, once before dinner if doing 2x daily)  -follow up with me in May 2024 to schedule endoscopy OR sooner if symptoms not improving     -will consider colonoscopy next year, at age 35 for family hx of advanced polyps and will add on EGD for chronic nausea & acid reflux if still symptomatic     Orders This Visit:  No orders of the defined types were placed in this encounter. Meds This Visit:  Requested Prescriptions     Pending Prescriptions Disp Refills    famotidine 20 MG Oral Tab 180 tablet 0     Sig: Take 1 tablet (20 mg total) by mouth 2 (two) times daily as needed for Heartburn. Imaging & Referrals:  None      [facility]  9/5/2023        This note was partially prepared using [employer] [facility] voice recognition dictation software. As a result, errors may occur. When identified, these errors have been corrected.  While every attempt is made to correct errors during dictation, discrepancies may still exist.

## 2023-09-08 ENCOUNTER — LAB ENCOUNTER (OUTPATIENT)
Dept: LAB | Facility: HOSPITAL | Age: 38
End: 2023-09-08
Payer: MEDICARE

## 2023-09-08 ENCOUNTER — OFFICE VISIT (OUTPATIENT)
Facility: CLINIC | Age: 38
End: 2023-09-08

## 2023-09-08 VITALS
WEIGHT: 127 LBS | DIASTOLIC BLOOD PRESSURE: 74 MMHG | HEART RATE: 90 BPM | SYSTOLIC BLOOD PRESSURE: 116 MMHG | HEIGHT: 62 IN | BODY MASS INDEX: 23.37 KG/M2

## 2023-09-08 DIAGNOSIS — K21.9 GASTROESOPHAGEAL REFLUX DISEASE, UNSPECIFIED WHETHER ESOPHAGITIS PRESENT: ICD-10-CM

## 2023-09-08 DIAGNOSIS — K62.5 BRBPR (BRIGHT RED BLOOD PER RECTUM): ICD-10-CM

## 2023-09-08 DIAGNOSIS — R11.0 NAUSEA: Primary | ICD-10-CM

## 2023-09-08 DIAGNOSIS — R11.0 NAUSEA: ICD-10-CM

## 2023-09-08 LAB — IGA SERPL-MCNC: 221 MG/DL (ref 70–312)

## 2023-09-08 PROCEDURE — 36415 COLL VENOUS BLD VENIPUNCTURE: CPT

## 2023-09-08 PROCEDURE — 83013 H PYLORI (C-13) BREATH: CPT

## 2023-09-08 PROCEDURE — 86364 TISS TRNSGLTMNASE EA IG CLAS: CPT

## 2023-09-08 PROCEDURE — 99214 OFFICE O/P EST MOD 30 MIN: CPT

## 2023-09-08 PROCEDURE — 82784 ASSAY IGA/IGD/IGG/IGM EACH: CPT

## 2023-09-08 RX ORDER — FAMOTIDINE 20 MG/1
20 TABLET, FILM COATED ORAL 2 TIMES DAILY PRN
Qty: 180 TABLET | Refills: 0 | Status: SHIPPED | OUTPATIENT
Start: 2023-09-08 | End: 2023-12-07

## 2023-09-08 NOTE — PATIENT INSTRUCTIONS
-STOP taking ondansetron (zofran) daily, ONLY take occasionally as needed for severe nausea/vomiting  -START taking famotidine (pepcid) 20mg once daily about 30 minutes before meal, you CAN increase to twice daily if needed (once before breakfast, once before dinner if doing 2x daily)  -follow up with me in May 2024 to schedule endoscopy OR sooner if symptoms not improving     Recommendations:  -can start fiber supplement daily  -increase physical activity, water intake & dietary fiber  -use over the counter hemorrhoid cream (hydrocortisone cream, such as preparation H) for 1 week at a time during flares  -can do sitz bath during flare  -use wet wipes (baby wipes) instead of toilet paper **  -avoid sitting on toilet for long periods of time and avoid straining    Fiber Supplements  -Metamucil (psyllium- both soluble & insoluble) *  -Citrucel (methylcellulose mostly insoluble) *  -Fibercon (polycarbophil- mostly soluble)  -Benefiber (wheat dextrin- mostly soluble)

## 2023-09-10 LAB — H PYLORI BREATH TEST: NEGATIVE

## 2023-09-11 LAB — TTG IGA SER-ACNC: 0.5 U/ML (ref ?–7)

## 2024-01-02 ENCOUNTER — OFFICE VISIT (OUTPATIENT)
Dept: INTERNAL MEDICINE CLINIC | Facility: CLINIC | Age: 39
End: 2024-01-02

## 2024-01-02 ENCOUNTER — NURSE TRIAGE (OUTPATIENT)
Dept: INTERNAL MEDICINE CLINIC | Facility: CLINIC | Age: 39
End: 2024-01-02

## 2024-01-02 VITALS
HEIGHT: 62 IN | TEMPERATURE: 98 F | SYSTOLIC BLOOD PRESSURE: 122 MMHG | BODY MASS INDEX: 24.29 KG/M2 | OXYGEN SATURATION: 97 % | DIASTOLIC BLOOD PRESSURE: 87 MMHG | HEART RATE: 91 BPM | WEIGHT: 132 LBS

## 2024-01-02 DIAGNOSIS — R11.0 NAUSEA: ICD-10-CM

## 2024-01-02 DIAGNOSIS — J02.9 SORE THROAT: Primary | ICD-10-CM

## 2024-01-02 LAB
CONTROL LINE PRESENT WITH A CLEAR BACKGROUND (YES/NO): YES YES/NO
KIT LOT #: 7283 NUMERIC
STREP GRP A CUL-SCR: NEGATIVE

## 2024-01-02 PROCEDURE — 87880 STREP A ASSAY W/OPTIC: CPT | Performed by: NURSE PRACTITIONER

## 2024-01-02 PROCEDURE — 99214 OFFICE O/P EST MOD 30 MIN: CPT | Performed by: NURSE PRACTITIONER

## 2024-01-02 RX ORDER — ONDANSETRON 4 MG/1
4 TABLET, ORALLY DISINTEGRATING ORAL EVERY 8 HOURS PRN
Qty: 30 TABLET | Refills: 0 | Status: SHIPPED | OUTPATIENT
Start: 2024-01-02

## 2024-01-02 NOTE — ASSESSMENT & PLAN NOTE
Hx of nausea- Hyperactive bowel sounds.  Requesting refill on Zofran    Plan  Continue  ondansetron 4 MG Oral Tablet Dispersible          Take 1 tablet (4 mg total) by mouth every 8 (eight) hours as needed for Nausea., Normal, Disp-30 tablet, R-0

## 2024-01-02 NOTE — ASSESSMENT & PLAN NOTE
Sore throat  Throat with Erythema- No exudate.    Patient with URI- Sore Throat    Plan  Hydrate with fluids- Gatorade  Tylenol two tabs every six hours.. Not to exceed 4 grams in one day.  Drink Hot tea with lemon  Drink Hot tea with honey  Gargle with warm salt water if you have a sore throat.    Rapid Strep- Negative  Sent for culture  COVID, FLU, RSV

## 2024-01-02 NOTE — TELEPHONE ENCOUNTER
Per father pt s/sx started yesterday she has a  sore throat,little cough, chest discomfort.Father brought her Theraflu and Nyquil yesterday and pt is saying that she does not feel it really helped her. I then asked father if I can speak to pt. Pt stated that she is not aware if she has a fever. Pt does not feel short breath and she does feel some chest discomfort only when coughing. Pt does does not have a home covid test kit. Pt was given a appt for today    Future Appointments   Date Time Provider Department Center   1/2/2024 10:40 AM Esther Goldberg APRN [facility]       Reason for Disposition   Continuous (nonstop) coughing interferes with work or school and no improvement using cough treatment per Care Advice    Protocols used: Cough-A-OH

## 2024-01-02 NOTE — PROGRESS NOTES
HPI:    Patient ID: Elza Jones is a 38 year old female.  Nuress Note    Per father pt s/sx started yesterday she has a  sore throat,little cough, chest discomfort.Father brought her Theraflu and Nyquil yesterday and pt is saying that she does not feel it really helped her. I then asked father if I can speak to pt. Pt stated that she is not aware if she has a fever. Pt does not feel short breath and she does feel some chest discomfort only when coughing. Pt does does not have a home covid test kit. Pt was given a appt for today        HPI Sore Throat  Sore throat started yesterday.Sore throat pain 5/10.  Throat is red  She took OTC Thermaflu and tylenol  No relief.    Hx of Nausea;  Father states they were told to take zofran daily as a preventive measure and can take an extra dose if she becomes nauseous. Also takes tums or rolaid at times.       Requesting refill for Zofran  Immunization History   Administered Date(s) Administered    Covid-19 Vaccine PubliAtis (J&J) 0.5ml 01/05/2022       Past Medical History:   Diagnosis Date    Cognitive developmental delay     JDMS (juvenile dermatomyositis) ([facility])     age 5, now in remission      Past Surgical History:   Procedure Laterality Date    Deep muscle biopsy  1991    thigh      Social History     Socioeconomic History    Marital status: Single   Tobacco Use    Smoking status: Never     Passive exposure: Never    Smokeless tobacco: Never   Vaping Use    Vaping Use: Never used   Substance and Sexual Activity    Alcohol use: Never    Drug use: Never          Review of Systems   Constitutional:  Negative for chills, fatigue and fever.   HENT:  Positive for sore throat. Negative for ear pain, hearing loss, sinus pain and trouble swallowing.    Eyes:  Negative for pain and visual disturbance.   Respiratory:  Negative for cough, chest tightness and shortness of breath.    Cardiovascular:  Negative for chest pain, palpitations and leg swelling.   Gastrointestinal:   Negative for abdominal pain, constipation, diarrhea, nausea and vomiting.   Endocrine: Negative for cold intolerance and heat intolerance.   Genitourinary:  Negative for dysuria and hematuria.   Musculoskeletal:  Negative for back pain and joint swelling.   Skin:  Negative for rash.   Allergic/Immunologic: Negative for environmental allergies.   Neurological:  Negative for weakness, numbness and headaches.   Hematological:  Does not bruise/bleed easily.   Psychiatric/Behavioral:  Negative for dysphoric mood and sleep disturbance. The patient is not nervous/anxious.               Current Outpatient Medications   Medication Sig Dispense Refill    ondansetron 4 MG Oral Tablet Dispersible Take 1 tablet (4 mg total) by mouth every 8 (eight) hours as needed for Nausea. 30 tablet 0    ergocalciferol 1.25 MG (69274 UT) Oral Cap Take 1 capsule (50,000 Units total) by mouth once a week. 4 capsule 3    ergocalciferol 1.25 MG (67997 UT) Oral Cap Take 1 capsule (50,000 Units total) by mouth once a week. 4 capsule 3     Allergies:  Allergies   Allergen Reactions    Amoxicillin HIVES      PHYSICAL EXAM:   Physical Exam  Constitutional:       General: She is not in acute distress.     Appearance: Normal appearance. She is well-developed. She is not ill-appearing, toxic-appearing or diaphoretic.   HENT:      Head: Normocephalic.      Right Ear: Tympanic membrane normal.      Left Ear: Tympanic membrane normal.      Mouth/Throat:      Pharynx: Posterior oropharyngeal erythema present.   Eyes:      Extraocular Movements: Extraocular movements intact.      Conjunctiva/sclera: Conjunctivae normal.   Cardiovascular:      Rate and Rhythm: Normal rate and regular rhythm.      Heart sounds: Normal heart sounds. No murmur heard.     No friction rub. No gallop.   Pulmonary:      Effort: Pulmonary effort is normal. No respiratory distress.      Breath sounds: Normal breath sounds. No wheezing, rhonchi or rales.   Abdominal:      General: Bowel  sounds are normal. There is no distension.      Palpations: Abdomen is soft. There is no mass.      Tenderness: There is no abdominal tenderness. There is no right CVA tenderness, left CVA tenderness or guarding.   Musculoskeletal:         General: No tenderness.      Cervical back: Normal range of motion and neck supple. No tenderness.      Right lower leg: No edema.      Left lower leg: No edema.   Lymphadenopathy:      Cervical: No cervical adenopathy.   Skin:     General: Skin is warm and dry.      Findings: No rash.   Neurological:      Mental Status: She is alert and oriented to person, place, and time.      Coordination: Coordination normal.      Gait: Gait normal.   Psychiatric:         Mood and Affect: Mood normal.         Behavior: Behavior normal.         Thought Content: Thought content normal.         Judgment: Judgment normal.       /87 (BP Location: Left arm, Patient Position: Sitting, Cuff Size: adult)   Pulse 91   Temp 97.7 °F (36.5 °C) (Tympanic)   Ht 5' 2\" (1.575 m)   Wt 132 lb (59.9 kg)   LMP  (LMP Unknown)   SpO2 97%   BMI 24.14 kg/m²   Wt Readings from Last 2 Encounters:   01/02/24 132 lb (59.9 kg)   09/08/23 127 lb (57.6 kg)     Body mass index is 24.14 kg/m².(2)  Lab Results   Component Value Date    WBC 16.2 (H) 07/31/2023    RBC 4.14 07/31/2023    HGB 12.0 07/31/2023    HCT 36.8 07/31/2023    MCV 88.9 07/31/2023    MCH 29.0 07/31/2023    MCHC 32.6 07/31/2023    RDW 14.2 07/31/2023    .0 07/31/2023      Lab Results   Component Value Date     (H) 07/31/2023    BUN 7 07/31/2023    BUNCREA 9.0 (L) 07/31/2023    CREATSERUM 0.78 07/31/2023    ANIONGAP 9 07/31/2023    GFRNAA 108 07/11/2022    GFRAA 125 07/11/2022    CA 9.3 07/31/2023    OSMOCALC 292 07/31/2023    ALKPHO 80 07/31/2023    AST 19 07/31/2023    ALT 20 07/31/2023    BILT 0.3 07/31/2023    TP 8.1 07/31/2023    ALB 3.9 07/31/2023    GLOBULIN 4.2 07/31/2023     07/31/2023    K 3.6 07/31/2023      07/31/2023    CO2 24.0 07/31/2023      No results found for: \"EAG\", \"A1C\"   Lab Results   Component Value Date    CHOLEST 245 (H) 05/09/2023    TRIG 141 05/09/2023    HDL 50 05/09/2023     (H) 05/09/2023    VLDL 28 05/09/2023    NONHDLC 195 (H) 05/09/2023      Lab Results   Component Value Date    TSH 0.485 05/09/2023                ASSESSMENT/PLAN:     Problem List Items Addressed This Visit       Sore throat - Primary     Sore throat  Throat with Erythema- No exudate.    Patient with URI- Sore Throat    Plan  Hydrate with fluids- Gatorade  Tylenol two tabs every six hours.. Not to exceed 4 grams in one day.  Drink Hot tea with lemon  Drink Hot tea with honey  Gargle with warm salt water if you have a sore throat.    Rapid Strep- Negative  Sent for culture  COVID, FLU, RSV         Relevant Orders    POC Rapid Strep [[id number]] (Completed)    Grp A Strep Cult, Throat    SARS-CoV-2/Flu A and B/RSV by PCR ([facility])    Nausea     Hx of nausea- Hyperactive bowel sounds.  Requesting refill on Zofran    Plan  Continue  ondansetron 4 MG Oral Tablet Dispersible          Take 1 tablet (4 mg total) by mouth every 8 (eight) hours as needed for Nausea., Normal, Disp-30 tablet, R-0          Relevant Medications    ondansetron 4 MG Oral Tablet Dispersible          Orders Placed This Encounter   Procedures    POC Rapid Strep [[id number]]    Grp A Strep Cult, Throat    SARS-CoV-2/Flu A and B/RSV by PCR ([facility])       Meds This Visit:  Requested Prescriptions     Signed Prescriptions Disp Refills    ondansetron 4 MG Oral Tablet Dispersible 30 tablet 0     Sig: Take 1 tablet (4 mg total) by mouth every 8 (eight) hours as needed for Nausea.       Imaging & Referrals:  None         [facility]

## 2024-01-02 NOTE — PATIENT INSTRUCTIONS
Patient with URI- Sore Throat    Plan  Hydrate with fluids  Tylenol two tabs every six hours.. Not to exceed 4 grams in one day.  Drink Hot tea with lemon  Drink Hot tea with honey  Gargle with warm salt water if you have a sore throat.    Rapid Strep- Negative  Sent for culture  COVID, FLU, RSV

## 2024-01-03 LAB
FLUAV + FLUBV RNA SPEC NAA+PROBE: DETECTED
FLUAV + FLUBV RNA SPEC NAA+PROBE: NOT DETECTED
RSV RNA SPEC NAA+PROBE: NOT DETECTED
SARS-COV-2 RNA RESP QL NAA+PROBE: NOT DETECTED

## 2024-01-03 RX ORDER — OSELTAMIVIR PHOSPHATE 75 MG/1
75 CAPSULE ORAL 2 TIMES DAILY
Qty: 10 CAPSULE | Refills: 0 | Status: SHIPPED | OUTPATIENT
Start: 2024-01-03 | End: 2024-01-08

## 2024-02-08 DIAGNOSIS — K21.9 GASTROESOPHAGEAL REFLUX DISEASE, UNSPECIFIED WHETHER ESOPHAGITIS PRESENT: ICD-10-CM

## 2024-02-12 RX ORDER — FAMOTIDINE 20 MG/1
20 TABLET, FILM COATED ORAL 2 TIMES DAILY PRN
Qty: 180 TABLET | Refills: 0 | Status: SHIPPED | OUTPATIENT
Start: 2024-02-12

## 2024-02-12 NOTE — TELEPHONE ENCOUNTER
Requested Prescriptions     Pending Prescriptions Disp Refills    FAMOTIDINE 20 MG Oral Tab [Pharmacy Med Name: Famotidine Oral Tablet 20 MG] 180 tablet 0     Sig: TAKE ONE TABLET BY MOUTH TWICE DAILY AS NEEDED FOR HEARTBURN       LOV 9/08/2023  LR  9/08/2023    em

## 2024-05-03 ENCOUNTER — HOSPITAL ENCOUNTER (OUTPATIENT)
Dept: GENERAL RADIOLOGY | Age: 39
Discharge: HOME | End: 2024-05-03
Attending: STUDENT IN AN ORGANIZED HEALTH CARE EDUCATION/TRAINING PROGRAM
Payer: MEDICARE

## 2024-05-03 ENCOUNTER — OFFICE VISIT (OUTPATIENT)
Dept: PODIATRY CLINIC | Facility: CLINIC | Age: 39
End: 2024-05-03

## 2024-05-03 DIAGNOSIS — M20.12 HALLUX VALGUS, BILATERAL: ICD-10-CM

## 2024-05-03 DIAGNOSIS — M20.11 HALLUX VALGUS, BILATERAL: ICD-10-CM

## 2024-05-03 DIAGNOSIS — M20.12 HALLUX VALGUS, BILATERAL: Primary | ICD-10-CM

## 2024-05-03 DIAGNOSIS — M20.11 HALLUX VALGUS, BILATERAL: Primary | ICD-10-CM

## 2024-05-03 PROCEDURE — 99203 OFFICE O/P NEW LOW 30 MIN: CPT | Performed by: STUDENT IN AN ORGANIZED HEALTH CARE EDUCATION/TRAINING PROGRAM

## 2024-05-03 PROCEDURE — 73630 X-RAY EXAM OF FOOT: CPT | Performed by: STUDENT IN AN ORGANIZED HEALTH CARE EDUCATION/TRAINING PROGRAM

## 2024-05-03 NOTE — PROGRESS NOTES
[facility] Podiatry  Progress Note      Elza Jones is a 38 year old female.   Chief Complaint   Patient presents with    Foot Pain     Consult- bilateral foot- stated bunion- per Dad the pt was given a brace fr [facility] to correct her toes but did not help- rates pain 7-8/10 on and off- L is worse than R             HPI:     Patient is a pleasant 38-year-old female presents to clinic for bilateral bunion pain evaluation.  Patient has used braces as well as wider shoes with no improvement of pain.  Admits that her left foot hurts more than right.  Patient is accompanied by her father today.    Allergies: Amoxicillin    Current Outpatient Medications   Medication Sig Dispense Refill    famotidine 20 MG Oral Tab Take 1 tablet (20 mg total) by mouth 2 (two) times daily as needed for Heartburn. 180 tablet 0    ondansetron 4 MG Oral Tablet Dispersible Take 1 tablet (4 mg total) by mouth every 8 (eight) hours as needed for Nausea. 30 tablet 0    ergocalciferol 1.25 MG (44580 UT) Oral Cap Take 1 capsule (50,000 Units total) by mouth once a week. 4 capsule 3    ergocalciferol 1.25 MG (46308 UT) Oral Cap Take 1 capsule (50,000 Units total) by mouth once a week. 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5, now in remission      Past Surgical History:   Procedure Laterality Date    Deep muscle biopsy  1991    thigh      Family History   Problem Relation Age of Onset    Breast Cancer Mother         breast ca    Diabetes Father     Lipids Father     Depression Brother     Stroke Maternal Grandmother     Colon Cancer Neg     Ovarian Cancer Neg       Social History     Socioeconomic History    Marital status: Single   Tobacco Use    Smoking status: Never     Passive exposure: Never    Smokeless tobacco: Never   Vaping Use    Vaping status: Never Used   Substance and Sexual Activity    Alcohol use: Never    Drug use: Never            REVIEW OF SYSTEMS:     Denies nause, fever, chills  No calf pain  Denies chest pain or SOB      EXAM:   LMP  (LMP Unknown)   GENERAL: well developed, well nourished, in no apparent distress  EXTREMITIES:   1. Integument: Normal skin temperature and turgor  2. Vascular: Dorsalis pedis two out of four bilateral and posterior tibial pulses two out of   four bilateral, capillary refill normal.   3. Musculoskeletal: All muscle groups are graded 5 out of 5 in the foot and ankle.  Lateral deviation of bilateral hallux with left worse than right with prominent first metatarsal medial eminence.  Pain with palpation along the left first MPJ.  No pain with left first MPJ range of motion.   4. Neurological: Normal sharp dull sensation; reflexes normal.             ASSESSMENT AND PLAN:   Diagnoses and all orders for this visit:    Hallux valgus, bilateral  -     XR FOOT, COMPLETE (MIN 3 VIEWS), LEFT (CPT=73630); Future  -     XR FOOT, COMPLETE (MIN 3 VIEWS), RIGHT (CPT=73630); Future        Plan:     Discussed the etiology of this condition as well as both conservative and surgical treatment options.  Discussed conservative measures to include wide shoes, extra depth shoes, padding, offloading or anti-inflammatory medication   Due to this condition being of structural origin, only surgical intervention will allow for correction of the deformity.   Discussed in detail regarding conservative treatment options including alteration in shoe gear, functional orthotics, toe spacer, bunion pads/guards, injection, etc.     Advised patient to obtain bilateral foot x-rays and to return to clinic in first week of June to discuss surgical treatment options.  We will discuss surgery for her left foot since that is the most painful.  Return to clinic in June for surgical consultation      The patient indicates understanding of these issues and agrees to the plan.        Mi Dubois DPM

## 2024-05-09 DIAGNOSIS — K21.9 GASTROESOPHAGEAL REFLUX DISEASE, UNSPECIFIED WHETHER ESOPHAGITIS PRESENT: ICD-10-CM

## 2024-05-09 RX ORDER — FAMOTIDINE 20 MG/1
20 TABLET, FILM COATED ORAL 2 TIMES DAILY PRN
Qty: 180 TABLET | Refills: 0 | Status: SHIPPED | OUTPATIENT
Start: 2024-05-09

## 2024-05-09 NOTE — TELEPHONE ENCOUNTER
Requested Prescriptions     Pending Prescriptions Disp Refills    FAMOTIDINE 20 MG Oral Tab [Pharmacy Med Name: Famotidine Oral Tablet 20 MG] 180 tablet 0     Sig: TAKE ONE TABLET BY MOUTH TWICE DAILY AS NEEDED FOR HEARTBURN        LOV   9/8/2023    LR    2/12/2024

## 2024-06-03 ENCOUNTER — OFFICE VISIT (OUTPATIENT)
Dept: PODIATRY CLINIC | Facility: CLINIC | Age: 39
End: 2024-06-03
Payer: MEDICARE

## 2024-06-03 ENCOUNTER — TELEPHONE (OUTPATIENT)
Dept: PODIATRY CLINIC | Facility: CLINIC | Age: 39
End: 2024-06-03

## 2024-06-03 DIAGNOSIS — M20.12 HALLUX VALGUS, LEFT: Primary | ICD-10-CM

## 2024-06-03 PROCEDURE — 99214 OFFICE O/P EST MOD 30 MIN: CPT | Performed by: STUDENT IN AN ORGANIZED HEALTH CARE EDUCATION/TRAINING PROGRAM

## 2024-06-03 NOTE — PROGRESS NOTES
[facility] Podiatry  Progress Note      Elza Jones is a 38 year old female.   Chief Complaint   Patient presents with    Bunions     F/u lateral bunion pain 6/10. Here to talk about possible bunionectomy of  Left foot. Father is present at this visit.             HPI:     Patient is a pleasant 38-year-old female presents to clinic accompanied by her father for evaluation of a painful left first metatarsal bunion.  She admits to exhausting all conservative treatment options including wider shoes, padding and toe still relief.  Patient would like to proceed with surgical treatment plan today.    Allergies: Amoxicillin    Current Outpatient Medications   Medication Sig Dispense Refill    FAMOTIDINE 20 MG Oral Tab TAKE ONE TABLET BY MOUTH TWICE DAILY AS NEEDED FOR HEARTBURN 180 tablet 0    ondansetron 4 MG Oral Tablet Dispersible Take 1 tablet (4 mg total) by mouth every 8 (eight) hours as needed for Nausea. 30 tablet 0    ergocalciferol 1.25 MG (18441 UT) Oral Cap Take 1 capsule (50,000 Units total) by mouth once a week. 4 capsule 3    ergocalciferol 1.25 MG (68117 UT) Oral Cap Take 1 capsule (50,000 Units total) by mouth once a week. 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5, now in remission      Past Surgical History:   Procedure Laterality Date    Deep muscle biopsy  1991    thigh      Family History   Problem Relation Age of Onset    Breast Cancer Mother         breast ca    Diabetes Father     Lipids Father     Depression Brother     Stroke Maternal Grandmother     Colon Cancer Neg     Ovarian Cancer Neg       Social History     Socioeconomic History    Marital status: Single   Tobacco Use    Smoking status: Never     Passive exposure: Never    Smokeless tobacco: Never   Vaping Use    Vaping status: Never Used   Substance and Sexual Activity    Alcohol use: Never    Drug use: Never           REVIEW OF SYSTEMS:     Denies  nause, fever, chills  No calf pain  Denies chest pain or SOB      EXAM:   LMP  (LMP Unknown)   GENERAL: well developed, well nourished, in no apparent distress  EXTREMITIES:   1. Integument: Normal skin temperature and turgor.   2. Vascular: Dorsalis pedis two out of four bilateral and posterior tibial pulses two out of   four bilateral, capillary refill normal.   3. Musculoskeletal: All muscle groups are graded 5 out of 5 in the foot and ankle.  Lateral deviation of left hallux with prominent first metatarsal.  Pain with palpation to prominent eminence of first metatarsal.  No pain with left first MPJ range of motion.  No crepitus noted at end of range of motion.   4. Neurological: Normal sharp dull sensation; reflexes normal.             ASSESSMENT AND PLAN:   There are no diagnoses linked to this encounter.    Plan:     Plan:      -Patient examined, chart history reviewed.  -Obtained and reviewed x-ray findings with patient and her father--mild bunion deformity appreciated left foot with prominent medial eminence noted to first metatarsal.  -Discussed etiology of condition and various treatment options including conservative and surgical treatment options.  -Conservatively, recommend wearing supportive shoes with wide toe box and taking anti-inflammatories as needed for pain.  Patient has exhausted conservative treatment options and is interested in surgery at this time.  -Surgically, I recommend Edwardo bunionectomy with possible Akin bunionectomy for patient.  Most of her pain is localized to prominent medial eminence of first metatarsal and she does not have very severe intermetatarsal angle.  -Surgery was discussed in detail including benefits, risks, and recovery period.  -Risks include, but are not limited to, continued pain, recurrence, infection, numbness, dehiscence, malunion, nonunion, and need for revision surgery.  Patient understands the risks and is interested in proceeding with surgery.  No  guarantees were given or implied.  -Our  will reach out to schedule surgery.  -We will plan to follow-up with patient 1 week postoperatively.  The patient indicates understanding of these issues and agrees to the plan.     Return 1 week after bunion surgery.            The patient indicates understanding of these issues and agrees to the plan.        Mi Dubois DPM

## 2024-06-03 NOTE — TELEPHONE ENCOUNTER
Procedure: First metatarsal Edwardo bunionectomy with  hallux Akin procedure     CPT code: 27460 and  13177  Length of Surgery: 120 minutes   Any Instruments: podiatry tray, power, andrade 3-0 fixos and staple   Call patient: ASAP  Anesthesia: MAC  Location: [facility]  Assistance: none  Pacemaker: unknown  Anticoagulants: unknown  Nickel Allergy: unknown  Latex Allergy: unknown  Diagnosis/ICD Code:   M20.12 Left hallux valgus

## 2024-07-05 ENCOUNTER — TELEPHONE (OUTPATIENT)
Dept: SURGERY | Facility: CLINIC | Age: 39
End: 2024-07-05

## 2024-07-08 ENCOUNTER — OFFICE VISIT (OUTPATIENT)
Dept: SURGERY | Facility: CLINIC | Age: 39
End: 2024-07-08
Payer: MEDICARE

## 2024-07-08 VITALS
BODY MASS INDEX: 23 KG/M2 | HEART RATE: 64 BPM | RESPIRATION RATE: 16 BRPM | DIASTOLIC BLOOD PRESSURE: 81 MMHG | WEIGHT: 127 LBS | SYSTOLIC BLOOD PRESSURE: 120 MMHG | OXYGEN SATURATION: 99 %

## 2024-07-08 DIAGNOSIS — L98.8 SKIN LESION OF BREAST: Primary | ICD-10-CM

## 2024-07-08 DIAGNOSIS — Z12.31 SCREENING MAMMOGRAM FOR BREAST CANCER: ICD-10-CM

## 2024-07-08 NOTE — PROGRESS NOTES
Breast Surgery Surveillance Visit    History of Present Illness:   Ms. Elza Jones is a 39 year old woman who presents with initial concern of left breast pain that prompted a diagnostic evaluation.  She has no personal prior history of breast disease or biopsies.  She denies any palpable masses, nipple discharge, skin changes or x-ray symptoms.  She does have a family history of breast cancer in her mom.  She had a bilateral diagnostic evaluation on 2023 that showed no concerning findings in the left breast within incidental focal asymmetry seen on the baseline right breast mammogram without a sonographic correlate for which a 6-month surveillance was recommended and took place. Last week she noticed a red skin lesion to her right breast at the 11 o'clock position 8cm from the nipple. She thinks it has gotten better since Friday, but is still there.  She is here today for evaluation and recommendations for further therapy.        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    Medications:     FAMOTIDINE 20 MG Oral Tab TAKE ONE TABLET BY MOUTH TWICE DAILY AS NEEDED FOR HEARTBURN 180 tablet 0       Allergies:    Allergies   Allergen Reactions    Amoxicillin HIVES       Family History:   Family History   Problem Relation Age of Onset    Breast Cancer Mother         breast ca    Diabetes Father     Lipids Father     Depression Brother     Stroke Maternal Grandmother     Colon Cancer Neg     Ovarian Cancer Neg        She is not of Ashkenazi Hindu ancestry.    Social  History:  History   Alcohol Use Never       History   Smoking Status    Never   Smokeless Tobacco    Never     Ms. Elza Jones is Single with 0 children. She has 1 siblings. She is currently Employed part-time      Review of Systems:  General:   The patient denies, fever, chills, night sweats, fatigue, generalized weakness, change in appetite or weight loss.    HEENT:     The patient denies eye irritation, cataracts, redness, glaucoma, yellowing of the eyes, change in vision, color blindness, or wearing contacts/glasses. The patient denies hearing loss, ringing in the ears, ear drainage, earaches, nasal congestion, nose bleeds, snoring, pain in mouth/throat, hoarseness, change in voice, facial trauma.    Respiratory:  The patient denies chronic cough, phlegm, hemoptysis, pleurisy/chest pain, pneumonia, asthma, wheezing, difficulty in breathing with exertion, emphysema, chronic bronchitis, shortness of breath or abnormal sound when breathing.     Cardiovascular:  There is no history of chest pain, chest pressure/discomfort, palpitations, irregular heartbeat, fainting or near-fainting, difficulty breathing when lying flat, SOB/Coughing at night, swelling of the legs or chest pain while walking.    Breasts:  See history of present illness    Gastrointestinal:     There is no history of difficulty or pain with swallowing, reflux symptoms, vomiting, dark or bloody stools, constipation, yellowing of the skin, indigestion, nausea, change in bowel habits, diarrhea, +abdominal pain or vomiting blood.     Genitourinary:  The patient denies frequent urination, needing to get up at night to urinate, urinary hesitancy or retaining urine, painful urination, urinary incontinence, decreased urine stream, blood in the urine or vaginal/penile discharge.    Skin:    The patient denies rash, itching, skin lesions, dry skin, change in skin color or change in moles.     Hematologic/Lymphatic:  The patient denies easily bruising or  bleeding or persistent swollen glands or lymph nodes.     Musculoskeletal:  The patient denies muscle aches/pain, joint pain, stiff joints, neck pain, back pain or bone pain.    Neuropsychiatric:  There is no history of migraines or severe headaches, seizure/epilepsy, speech problems, coordination problems, trembling/tremors, fainting/black outs, dizziness, memory problems, loss of sensation/numbness, problems walking, weakness, tingling or burning in hands/feet. There is no history of abusive relationship, bipolar disorder, sleep disturbance, +anxiety, depression or feeling of despair.    Endocrine:    There is no history of poor/slow wound healing, weight loss/gain, fertility or hormone problems, cold intolerance, thyroid disease.     Allergic/Immunologic:  There is no history of hives, hay fever, angioedema or anaphylaxis.    /81 (BP Location: Right arm, Patient Position: Sitting, Cuff Size: adult)   Pulse 64   Resp 16   Wt 57.6 kg (127 lb)   LMP  (LMP Unknown)   SpO2 99%   BMI 23.23 kg/m²     Physical Exam:  The patient is an alert, oriented, well-nourished and  well-developed woman who appears her stated age. Her speech patterns and movements are normal. Her affect is appropriate.    HEENT: The head is normocephalic. The neck is supple. The thyroid is not enlarged and is without palpable masses/nodules. There are no palpable masses. The trachea is in the midline. Conjunctiva are clear, non-icteric.    Chest: The chest expands symmetrically. The lungs are clear to auscultation.    Heart: The rhythm is regular.  There are no murmurs, rubs, gallops or thrills.    Breasts:  Her breasts are symmetrical.  Right breast: The skin, nipple ,and areola appear normal. There is no skin dimpling with movement of the pectoralis. There is no nipple retraction. No nipple discharge can be elicited. The parenchyma is mildly nodular. There are no dominant masses in the breast. The axillary tail is normal.  Left breast:    The skin, nipple, and areola appear normal. There is no skin dimpling with movement of the pectoralis. There is no nipple retraction. No nipple discharge can be elicited. The parenchyma is mildly nodular. There are no dominant masses in the breast. The axillary tail is normal.    Abdomen:  The abdomen is soft, flat and non tender. The liver is not enlarged. There are no palpable masses.    Lymph Nodes:  The supraclavicular, axillary and cervical regions are free of significant lymphadenopathy.    Back: There is no vertebral column tenderness.    Skin: The skin appears normal. There are no suspicious appearing rashes or lesions.    Extremities: The extremities are without deformity, cyanosis or edema.    Impression:   Ms. Elza Jones is a 39 year old woman presents with family history of breast cancer, resolved breast pain and imaging detected right breast asymmetry with a new skin lesion to her right breast.    Discussion and Plan:  I had a discussion with the Patient and her Father regarding her breast exam. On exam today I found a 1cm area of what appears to be eczema to her right breast. I recommend to apply hydrocortisone cream to the area twice daily and update us in 1 week. If there is no improvement a punch biopsy may be recommended. She recently turned 39, and is due for a mammogram. An order for this was placed.    She was given ample opportunity for questions and those questions were answered to her satisfaction. She has been  encouraged to contact the office with any questions or concerns prior to her next appointment.

## 2024-07-15 ENCOUNTER — TELEPHONE (OUTPATIENT)
Dept: SURGERY | Facility: CLINIC | Age: 39
End: 2024-07-15

## 2024-07-15 NOTE — TELEPHONE ENCOUNTER
Returned patient call . Talk to patient's father. Answered all the questions to the best of my ability. Patient father verbalized understanding Will contact us if have any further question.

## 2024-08-03 DIAGNOSIS — K21.9 GASTROESOPHAGEAL REFLUX DISEASE, UNSPECIFIED WHETHER ESOPHAGITIS PRESENT: ICD-10-CM

## 2024-08-05 RX ORDER — FAMOTIDINE 20 MG/1
20 TABLET, FILM COATED ORAL 2 TIMES DAILY PRN
Qty: 180 TABLET | Refills: 0 | Status: SHIPPED | OUTPATIENT
Start: 2024-08-05

## 2024-08-05 NOTE — TELEPHONE ENCOUNTER
Requested Prescriptions     Pending Prescriptions Disp Refills    FAMOTIDINE 20 MG Oral Tab [Pharmacy Med Name: Famotidine Oral Tablet 20 MG] 180 tablet 0     Sig: TAKE ONE TABLET BY MOUTH TWICE DAILY AS NEEDED FOR HEARTBURN       LOV 9/08/23  LR 5/09/24  Routed to provider on call    em

## 2024-10-16 ENCOUNTER — MED REC SCAN ONLY (OUTPATIENT)
Dept: INTERNAL MEDICINE CLINIC | Facility: CLINIC | Age: 39
End: 2024-10-16

## 2024-10-18 ENCOUNTER — NURSE TRIAGE (OUTPATIENT)
Dept: INTERNAL MEDICINE CLINIC | Facility: CLINIC | Age: 39
End: 2024-10-18

## 2024-10-18 NOTE — TELEPHONE ENCOUNTER
Action Requested: Summary for Provider     []  Critical Lab, Recommendations Needed  [] Need Additional Advice  []   FYI    []   Need Orders  [] Need Medications Sent to Pharmacy  []  Other     SUMMARY: Disposition per  protocol is to be seen in office within 3 days.  Parent requests an appointment with Dr Myers on Wednesday:  Future Appointments   Date Time Provider Department Center   10/23/2024 10:30 AM Iqra Myers MD [facility]  [place]         Reason for call: Difficulty Swallowing  Onset: Wednesday       Patient's father  calling,verified name and date of birth. Patient joins the call for triage.    A few days ago patient felt like she was choking in the middle of the night She was able to speak clearly and was not coughing. Father notices she is not eating as much as usual but is drinking fluids.  She has been prescribed ondansetron and famotidine by GI for GERD.   Patient reports  that since Wednesday it feels like there is something in her throat  sometimes and she feels nauseated occasionally. Reviewed care advice with father  to continue medications as prescribed by GI, small bites of food and small amounts of fluids, call back or consider ER if symptoms worsen. father verbalizes understanding and agrees to plan of care.    Reason for Disposition   Patient wants to be seen    Protocols used: Swallowing Difficulty-A-[place]

## 2024-10-20 ENCOUNTER — HOSPITAL ENCOUNTER (OUTPATIENT)
Facility: HOSPITAL | Age: 39
Setting detail: OBSERVATION
Discharge: HOME | End: 2024-10-21
Attending: EMERGENCY MEDICINE | Admitting: HOSPITALIST
Payer: MEDICARE

## 2024-10-20 ENCOUNTER — APPOINTMENT (OUTPATIENT)
Dept: GENERAL RADIOLOGY | Facility: HOSPITAL | Age: 39
End: 2024-10-20
Attending: EMERGENCY MEDICINE
Payer: MEDICARE

## 2024-10-20 ENCOUNTER — APPOINTMENT (OUTPATIENT)
Dept: CT IMAGING | Facility: HOSPITAL | Age: 39
End: 2024-10-20
Attending: EMERGENCY MEDICINE
Payer: MEDICARE

## 2024-10-20 DIAGNOSIS — R13.19 ESOPHAGEAL DYSPHAGIA: Primary | ICD-10-CM

## 2024-10-20 LAB
ALBUMIN SERPL-MCNC: 5.3 G/DL (ref 3.2–4.8)
ALP LIVER SERPL-CCNC: 80 U/L
ALT SERPL-CCNC: 12 U/L
ANION GAP SERPL CALC-SCNC: 17 MMOL/L (ref 0–18)
AST SERPL-CCNC: 18 U/L (ref ?–34)
BASOPHILS # BLD AUTO: 0.03 X10(3) UL (ref 0–0.2)
BASOPHILS NFR BLD AUTO: 0.3 %
BILIRUB DIRECT SERPL-MCNC: 0.2 MG/DL (ref ?–0.3)
BILIRUB SERPL-MCNC: 0.7 MG/DL (ref 0.3–1.2)
BUN BLD-MCNC: 18 MG/DL (ref 9–23)
BUN/CREAT SERPL: 20.7 (ref 10–20)
CALCIUM BLD-MCNC: 10.5 MG/DL (ref 8.7–10.4)
CHLORIDE SERPL-SCNC: 104 MMOL/L (ref 98–112)
CO2 SERPL-SCNC: 18 MMOL/L (ref 21–32)
CREAT BLD-MCNC: 0.87 MG/DL
DEPRECATED RDW RBC AUTO: 43.2 FL (ref 35.1–46.3)
EGFRCR SERPLBLD CKD-EPI 2021: 86 ML/MIN/1.73M2 (ref 60–?)
EOSINOPHIL # BLD AUTO: 0.03 X10(3) UL (ref 0–0.7)
EOSINOPHIL NFR BLD AUTO: 0.3 %
ERYTHROCYTE [DISTWIDTH] IN BLOOD BY AUTOMATED COUNT: 13.2 % (ref 11–15)
GLUCOSE BLD-MCNC: 92 MG/DL (ref 70–99)
HCT VFR BLD AUTO: 38 %
HGB BLD-MCNC: 13.1 G/DL
IMM GRANULOCYTES # BLD AUTO: 0.04 X10(3) UL (ref 0–1)
IMM GRANULOCYTES NFR BLD: 0.4 %
LYMPHOCYTES # BLD AUTO: 3.01 X10(3) UL (ref 1–4)
LYMPHOCYTES NFR BLD AUTO: 29.4 %
MCH RBC QN AUTO: 30.7 PG (ref 26–34)
MCHC RBC AUTO-ENTMCNC: 34.5 G/DL (ref 31–37)
MCV RBC AUTO: 89 FL
MONOCYTES # BLD AUTO: 0.69 X10(3) UL (ref 0.1–1)
MONOCYTES NFR BLD AUTO: 6.7 %
NEUTROPHILS # BLD AUTO: 6.45 X10 (3) UL (ref 1.5–7.7)
NEUTROPHILS # BLD AUTO: 6.45 X10(3) UL (ref 1.5–7.7)
NEUTROPHILS NFR BLD AUTO: 62.9 %
OSMOLALITY SERPL CALC.SUM OF ELEC: 290 MOSM/KG (ref 275–295)
PLATELET # BLD AUTO: 414 10(3)UL (ref 150–450)
POTASSIUM SERPL-SCNC: 3.7 MMOL/L (ref 3.5–5.1)
PROT SERPL-MCNC: 8.3 G/DL (ref 5.7–8.2)
RBC # BLD AUTO: 4.27 X10(6)UL
SODIUM SERPL-SCNC: 139 MMOL/L (ref 136–145)
WBC # BLD AUTO: 10.3 X10(3) UL (ref 4–11)

## 2024-10-20 PROCEDURE — 71045 X-RAY EXAM CHEST 1 VIEW: CPT | Performed by: EMERGENCY MEDICINE

## 2024-10-20 PROCEDURE — 74177 CT ABD & PELVIS W/CONTRAST: CPT | Performed by: EMERGENCY MEDICINE

## 2024-10-20 PROCEDURE — 99223 1ST HOSP IP/OBS HIGH 75: CPT | Performed by: HOSPITALIST

## 2024-10-20 PROCEDURE — 99223 1ST HOSP IP/OBS HIGH 75: CPT | Performed by: STUDENT IN AN ORGANIZED HEALTH CARE EDUCATION/TRAINING PROGRAM

## 2024-10-20 PROCEDURE — 71260 CT THORAX DX C+: CPT | Performed by: EMERGENCY MEDICINE

## 2024-10-20 RX ORDER — FAMOTIDINE 10 MG/ML
20 INJECTION, SOLUTION INTRAVENOUS 2 TIMES DAILY
Status: DISCONTINUED | OUTPATIENT
Start: 2024-10-20 | End: 2024-10-20

## 2024-10-20 RX ORDER — SODIUM CHLORIDE 9 MG/ML
INJECTION, SOLUTION INTRAVENOUS CONTINUOUS
Status: ACTIVE | OUTPATIENT
Start: 2024-10-20 | End: 2024-10-20

## 2024-10-20 RX ORDER — HEPARIN SODIUM 5000 [USP'U]/ML
5000 INJECTION, SOLUTION INTRAVENOUS; SUBCUTANEOUS EVERY 12 HOURS SCHEDULED
Status: COMPLETED | OUTPATIENT
Start: 2024-10-20 | End: 2024-10-20

## 2024-10-20 RX ORDER — ONDANSETRON 2 MG/ML
4 INJECTION INTRAMUSCULAR; INTRAVENOUS EVERY 6 HOURS PRN
Status: DISCONTINUED | OUTPATIENT
Start: 2024-10-20 | End: 2024-10-21

## 2024-10-20 RX ORDER — SODIUM CHLORIDE 9 MG/ML
INJECTION, SOLUTION INTRAVENOUS CONTINUOUS
Status: DISCONTINUED | OUTPATIENT
Start: 2024-10-20 | End: 2024-10-21

## 2024-10-20 RX ORDER — ACETAMINOPHEN 500 MG
500 TABLET ORAL EVERY 4 HOURS PRN
Status: DISCONTINUED | OUTPATIENT
Start: 2024-10-20 | End: 2024-10-21

## 2024-10-20 RX ORDER — FAMOTIDINE 10 MG/ML
20 INJECTION, SOLUTION INTRAVENOUS ONCE
Status: COMPLETED | OUTPATIENT
Start: 2024-10-20 | End: 2024-10-20

## 2024-10-20 NOTE — ED INITIAL ASSESSMENT (HPI)
Pt reports she has had difficulty swallowing x 4-5 days, pt also reports she did have some difficulty breathing on Wednesday. Pt reports having a tight chest yesterday and was given mucinex, which did help. Pt reports diffuclty swallowing pills and coughing fluid up.

## 2024-10-20 NOTE — ED PROVIDER NOTES
Patient Seen in: [facility] Emergency Department    History     Chief Complaint   Patient presents with    Swallowing Problem       HPI    39-year-old female with past medical history significant for cognitive developmental delay, GERD, presents to the ED for difficulty swallowing since Wednesday night.  Patient does not remember a discrete episode of inability to swallow food or choking with food but woke up in the middle the night feeling like she was choking.  Since then she has not been able to eat any solid food.  She has been able to drink liquids but she has not been able to take any medications either.  She was feeling some slight tightness in her chest yesterday according to her father.  Father states that patient has been diagnosed with GERD in the past and tried taking her antacid medications this morning but they got stuck and came back up in her mouth.    History from Independent Source: Father gave initial history as stated in HPI    External Records Reviewed: Reviewed GI notes from September 2023.  Patient had been complaining of intermittent nausea.  She was having a bad taste in her mouth and occasional abdominal cramping.  Patient was started on Pepcid at that time and was recommended for possible endoscopy if symptoms did not improve.    History reviewed.   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   Past Surgical History:   Procedure Laterality Date    Deep muscle biopsy  1991    thigh         Medications :  Prescriptions Prior to Admission[1]     Family History   Problem Relation Age of Onset    Breast Cancer Mother         breast ca    Diabetes Father     Lipids Father     Depression Brother     Stroke Maternal Grandmother     Colon Cancer Neg     Ovarian Cancer Neg        Smoking Status:   Social History     Socioeconomic History    Marital status: Single   Tobacco Use    Smoking  status: Never     Passive exposure: Never    Smokeless tobacco: Never   Vaping Use    Vaping status: Never Used   Substance and Sexual Activity    Alcohol use: Yes    Drug use: Never       Constitutional and vital signs reviewed.      Social History and Family History elements reviewed from today, pertinent positives to the presenting problem noted.    Physical Exam     ED Triage Vitals [10/20/24 0709]   /83   Pulse 120   Resp 18   Temp 97.8 °F (36.6 °C)   Temp src Temporal   SpO2 99 %   O2 Device None (Room air)       Physical Exam   Constitutional: AAOx3, well nourished, NAD  HEENT: Normocephalic, PERRLA, MMM  CV: s1s2+, RRR, no m/r/g, normal distal pulses  Pulmonary/Chest: CTA b/l with no rales, wheezes.  No chest wall tenderness  Abdominal: Nontender.  Nondistended. Soft. Bowel sounds are normal.   Neck/Back:   :   Musculoskeletal: Normal range of motion. No deformity.   Neurological: Awake, alert. Normal reflexes. No cranial nerve deficit.    Skin: Skin is warm and dry. No rash noted. No erythema.   Psychiatric:      All measures to prevent infection transmission during my interaction with the patient were taken. The patient was already wearing a droplet mask on my arrival to the room. Personal protective equipment was worn throughout the duration of the exam.      ED Course        Labs Reviewed   BASIC METABOLIC PANEL (8) - Abnormal; Notable for the following components:       Result Value    CO2 18.0 (*)     BUN/CREA Ratio 20.7 (*)     Calcium, Total 10.5 (*)     All other components within normal limits   HEPATIC FUNCTION PANEL (7) - Abnormal; Notable for the following components:    Total Protein 8.3 (*)     Albumin 5.3 (*)     All other components within normal limits   CBC WITH DIFFERENTIAL WITH PLATELET     My Independent Interpretation of EKG (if performed): EKG    Rate, intervals and axes as noted on EKG Report.  Rate: EKG shows heart rate of 104 bpm  Rhythm: Sinus Rhythm  Reading: Sinus  tachycardia, PACs, LVH, no acute ST changes             Monitor Interpretation:   sinus tachycardia, occasional premature atrial beats as interpreted by me.      Imaging Results Available and Reviewed while in ED: XR CHEST AP PORTABLE  (CPT=71045)    Result Date: 10/20/2024  CONCLUSION: No acute cardiopulmonary disease.    Dictated by (CST): Kishan Blake MD on 10/20/2024 at 8:28 AM     Finalized by (CST): Kishan Blake MD on 10/20/2024 at 8:29 AM         ED Medications Administered:   Medications   sodium chloride 0.9 % IV bolus 1,000 mL (1,000 mL Intravenous New Bag 10/20/24 0743)   famotidine (Pepcid) 20 mg/2mL injection 20 mg (20 mg Intravenous Given 10/20/24 0743)             MDM     Vitals:    10/20/24 0709   BP: 130/83   Pulse: 120   Resp: 18   Temp: 97.8 °F (36.6 °C)   TempSrc: Temporal   SpO2: 99%   Weight: 54.4 kg   Height: 157.5 cm (5' 2\")     *I personally reviewed and interpreted all ED vitals.    Independent Interpretation of Studies: I have independently reviewed chest x-ray and there are no significant acute findings    Social Determinants of Health:     Procedures:      Differential/MDM/Shared Decision Making: Differential Diagnosis includes esophageal stricture, esophageal obstruction, GERD, others.      The patient already  has a past medical history of Cognitive developmental delay and JDMS (juvenile dermatomyositis) ([facility]  to contribute to the complexity of this ED evaluation.           Medications, Diagnostics, or Disposition considered but not done:     Patient's workup in the ED is largely unremarkable.  She did have some tachycardia on initial evaluation but is responding well to IV fluid bolus.  Patient may have narrowing of the esophagus.  Management of case was discussed with Dr. Villalpando from gastroenterology and he would like patient to get CT of the chest abdomen pelvis to fully evaluate the esophagus.  He like patient to be admitted as they may plan for EGD.  [facility] and [facility] have accepted patient for admission.  Father and patient are agreeable with plan.    Critical care time exclusive of procedure time spent on this patient was 31 min for taking history from patient examining patient, medical decision-making, reviewing lab work and radiology studies, explaining results to patient and family, discussing with consultants/admitting physician, and documenting in patient's chart.    Condition upon leaving the department: Stable    Disposition and Plan     Clinical Impression:  1. Esophageal dysphagia        Disposition:  Admit    Follow-up:  No follow-up provider specified.    Medications Prescribed:  Current Discharge Medication List          Hospital Problems       Present on Admission  Date Reviewed: 10/16/2024            ICD-10-CM Noted POA    * (Principal) Esophageal dysphagia R13.19 10/20/2024 Unknown               [1] (Not in a hospital admission)

## 2024-10-20 NOTE — CONSULTS
[facility]   Gastroenterology Consultation Note    Elza Jones  Patient Status:    Observation  Date of Admission:         10/20/2024, Hospital day #0  Attending:   Jay Logan MD  PCP:     Geni Barron MD    Chief Complaint:  Dysphagia    History of Present Illness:  Elza Jones is a 39 year old female w/ a history of cognitive developmental delay, who presents with dysphagia/abdominal fullness.    Difficult to obtain history from the patient given known developmental delay.    The father is at the bedside and provides most the history.  The patient consumed a solid meal on 10/16.  Since then she has complained of intermittent abdominal fullness and decreased appetite.  She has been able to consume liquids without issues.  She has no vomiting.  No chest discomfort.  She did consume soup yesterday not seem to have much of a problem.  She notes that food may be going down slowly but she is not sure.    Given the patient's lack of appetite she was brought to the emergency department by her dad.    CT scan of the chest/abdomen/pelvis was done and was normal.  The esophagus was normal in caliber and there was no evidence of a fluid/debris-filled esophagus on imaging.    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 She has never been exposed to tobacco smoke. She has never used smokeless tobacco. She reports current alcohol use. She reports that she does not use drugs.    Allergies:  Allergies[1]    Medications:    Current  Facility-Administered Medications:     sodium chloride 0.9% infusion, , Intravenous, Continuous    heparin (Porcine) 5000 UNIT/ML injection 5,000 Units, 5,000 Units, Subcutaneous, 2 times per day    acetaminophen (Tylenol Extra Strength) tab 500 mg, 500 mg, Oral, Q4H PRN    famotidine (Pepcid) 20 mg/2mL injection 20 mg, 20 mg, Intravenous, BID    ondansetron (Zofran) 4 MG/2ML injection 4 mg, 4 mg, Intravenous, Q6H PRN    Review of Systems:  CONSTITUTIONAL:  negative for fevers, rigors  EYES:  negative for diplopia   RESPIRATORY:  negative for severe shortness of breath  CARDIOVASCULAR:  negative for crushing sub-sternal chest pain  GASTROINTESTINAL:  see HPI  GENITOURINARY:  negative for dysuria or gross hematuria  INTEGUMENT/BREAST:  SKIN:  negative for jaundice   ALLERGIC/IMMUNOLOGIC:  negative for hay fever  ENDOCRINE:  negative for cold intolerance and heat intolerance  MUSCULOSKELETAL:  negative for joint effusion/severe erythema  BEHAVIOR/PSYCH:  negative for psychotic behavior    Physical Exam:    Blood pressure 130/75, pulse 86, temperature 98.3 °F (36.8 °C), temperature source Oral, resp. rate 16, height 5' 2\" (1.575 m), weight 122 lb 14.4 oz (55.7 kg), last menstrual period 10/06/2024, SpO2 98%. Body mass index is 22.48 kg/m².    Gen- Patient appears comfortable and in no acute discomfort  HEENT: the sclera appears anicteric, oropharynx clear, mucus membranes appear moist  CV- regular rate and rhythm, the extremities are warm and well perfused   Lung- Moves air well; No labored breathing  Abdomen- soft, non-tender exam in all quadrants without rigidity or guarding, non-distended, no abnormal bowel sounds noted, no masses are palpated  Skin- No jaundice  Ext: no cyanosis, clubbing or edema is evident.   Neuro- Alert and interactive, and gross movements of extremities normal  Psych- appropriate, non-agitated    Laboratory Data:  Lab Results   Component Value Date    WBC 10.3 10/20/2024    HGB 13.1 10/20/2024     HCT 38.0 10/20/2024    .0 10/20/2024    CREATSERUM 0.87 10/20/2024    BUN 18 10/20/2024     10/20/2024    K 3.7 10/20/2024     10/20/2024    CO2 18.0 10/20/2024    GLU 92 10/20/2024    CA 10.5 10/20/2024    ALB 5.3 10/20/2024    ALKPHO 80 10/20/2024    BILT 0.7 10/20/2024    TP 8.3 10/20/2024    AST 18 10/20/2024    ALT 12 10/20/2024       Imaging:  XR CHEST AP PORTABLE  (CPT=71045)    Result Date: 10/20/2024  CONCLUSION: No acute cardiopulmonary disease.    Dictated by (CST): Kishan Blake MD on 10/20/2024 at 8:28 AM     Finalized by (CST): Kishan Blake MD on 10/20/2024 at 8:29 AM           Assessment & Plan   Elza Jones is a 39 year old female w/ a history of cognitive developmental delay, who presents with dysphagia/abdominal fullness.    #Dysphagia  The patient has dysphagia complaints but also abdominal fullness.  It is difficult to understand exactly what she is complaining about however she is consuming liquids with no problems.  She did consume soup with vegetables recently and did not have any issues with this.    CT scan of the chest/abdomen/pelvis is unremarkable.  There is no evidence of dilated esophagus/debris and fluid-filled esophagus on imaging.    Given complaint of dysphagia it is reasonable to proceed with an EGD tomorrow and the family agrees.    I have discussed the risks, benefits, and alternatives to upper endoscopy with the patient/primary decision maker [who demonstrated understanding], including but not limited to the risks of bleeding, infection, pain, as well as the risks of anesthesia and perforation all leading to prolonged hospitalization, surgical intervention. The patient has agreed to sign an informed consent and elected to proceed with upper endoscopy with possible intervention [i.e. polypectomy, control of bleeding, dilatation etc.] as indicated.    Manuel Villalpando MD  [facility]        This note was partially  prepared using Dragon Medical voice recognition dictation software. As a result, errors may occur. When identified, these errors have been corrected. While every attempt is made to correct errors during dictation, discrepancies may still exist.          [1]   Allergies  Allergen Reactions    Amoxicillin HIVES

## 2024-10-20 NOTE — ED QUICK NOTES
Orders for admission, patient is aware of plan and ready to go upstairs. Any questions, please call ED RN Shanel at extension [phone].     Patient Covid vaccination status: Partially vaccinated     COVID Test Ordered in ED: None    COVID Suspicion at Admission: N/A    Running Infusions:      Mental Status/LOC at time of transport: Aox4, developmental delay    Other pertinent information:   CIWA score: N/A   NIH score:  N/A

## 2024-10-20 NOTE — H&P (VIEW-ONLY)
[facility]   Gastroenterology Consultation Note    Elza Jones  Patient Status:    Observation  Date of Admission:         10/20/2024, Hospital day #0  Attending:   Jay Logan MD  PCP:     Geni Barron MD    Chief Complaint:  Dysphagia    History of Present Illness:  Elza Jones is a 39 year old female w/ a history of cognitive developmental delay, who presents with dysphagia/abdominal fullness.    Difficult to obtain history from the patient given known developmental delay.    The father is at the bedside and provides most the history.  The patient consumed a solid meal on 10/16.  Since then she has complained of intermittent abdominal fullness and decreased appetite.  She has been able to consume liquids without issues.  She has no vomiting.  No chest discomfort.  She did consume soup yesterday not seem to have much of a problem.  She notes that food may be going down slowly but she is not sure.    Given the patient's lack of appetite she was brought to the emergency department by her dad.    CT scan of the chest/abdomen/pelvis was done and was normal.  The esophagus was normal in caliber and there was no evidence of a fluid/debris-filled esophagus on imaging.    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 She has never been exposed to tobacco smoke. She has never used smokeless tobacco. She reports current alcohol use. She reports that she does not use drugs.    Allergies:  Allergies[1]    Medications:    Current  Facility-Administered Medications:     sodium chloride 0.9% infusion, , Intravenous, Continuous    heparin (Porcine) 5000 UNIT/ML injection 5,000 Units, 5,000 Units, Subcutaneous, 2 times per day    acetaminophen (Tylenol Extra Strength) tab 500 mg, 500 mg, Oral, Q4H PRN    famotidine (Pepcid) 20 mg/2mL injection 20 mg, 20 mg, Intravenous, BID    ondansetron (Zofran) 4 MG/2ML injection 4 mg, 4 mg, Intravenous, Q6H PRN    Review of Systems:  CONSTITUTIONAL:  negative for fevers, rigors  EYES:  negative for diplopia   RESPIRATORY:  negative for severe shortness of breath  CARDIOVASCULAR:  negative for crushing sub-sternal chest pain  GASTROINTESTINAL:  see HPI  GENITOURINARY:  negative for dysuria or gross hematuria  INTEGUMENT/BREAST:  SKIN:  negative for jaundice   ALLERGIC/IMMUNOLOGIC:  negative for hay fever  ENDOCRINE:  negative for cold intolerance and heat intolerance  MUSCULOSKELETAL:  negative for joint effusion/severe erythema  BEHAVIOR/PSYCH:  negative for psychotic behavior    Physical Exam:    Blood pressure 130/75, pulse 86, temperature 98.3 °F (36.8 °C), temperature source Oral, resp. rate 16, height 5' 2\" (1.575 m), weight 122 lb 14.4 oz (55.7 kg), last menstrual period 10/06/2024, SpO2 98%. Body mass index is 22.48 kg/m².    Gen- Patient appears comfortable and in no acute discomfort  HEENT: the sclera appears anicteric, oropharynx clear, mucus membranes appear moist  CV- regular rate and rhythm, the extremities are warm and well perfused   Lung- Moves air well; No labored breathing  Abdomen- soft, non-tender exam in all quadrants without rigidity or guarding, non-distended, no abnormal bowel sounds noted, no masses are palpated  Skin- No jaundice  Ext: no cyanosis, clubbing or edema is evident.   Neuro- Alert and interactive, and gross movements of extremities normal  Psych- appropriate, non-agitated    Laboratory Data:  Lab Results   Component Value Date    WBC 10.3 10/20/2024    HGB 13.1 10/20/2024     HCT 38.0 10/20/2024    .0 10/20/2024    CREATSERUM 0.87 10/20/2024    BUN 18 10/20/2024     10/20/2024    K 3.7 10/20/2024     10/20/2024    CO2 18.0 10/20/2024    GLU 92 10/20/2024    CA 10.5 10/20/2024    ALB 5.3 10/20/2024    ALKPHO 80 10/20/2024    BILT 0.7 10/20/2024    TP 8.3 10/20/2024    AST 18 10/20/2024    ALT 12 10/20/2024       Imaging:  XR CHEST AP PORTABLE  (CPT=71045)    Result Date: 10/20/2024  CONCLUSION: No acute cardiopulmonary disease.    Dictated by (CST): Kishan Blake MD on 10/20/2024 at 8:28 AM     Finalized by (CST): Kishan Blake MD on 10/20/2024 at 8:29 AM           Assessment & Plan   Elza Jones is a 39 year old female w/ a history of cognitive developmental delay, who presents with dysphagia/abdominal fullness.    #Dysphagia  The patient has dysphagia complaints but also abdominal fullness.  It is difficult to understand exactly what she is complaining about however she is consuming liquids with no problems.  She did consume soup with vegetables recently and did not have any issues with this.    CT scan of the chest/abdomen/pelvis is unremarkable.  There is no evidence of dilated esophagus/debris and fluid-filled esophagus on imaging.    Given complaint of dysphagia it is reasonable to proceed with an EGD tomorrow and the family agrees.    I have discussed the risks, benefits, and alternatives to upper endoscopy with the patient/primary decision maker [who demonstrated understanding], including but not limited to the risks of bleeding, infection, pain, as well as the risks of anesthesia and perforation all leading to prolonged hospitalization, surgical intervention. The patient has agreed to sign an informed consent and elected to proceed with upper endoscopy with possible intervention [i.e. polypectomy, control of bleeding, dilatation etc.] as indicated.    Manuel Villalpando MD  [facility]        This note was partially  prepared using Dragon Medical voice recognition dictation software. As a result, errors may occur. When identified, these errors have been corrected. While every attempt is made to correct errors during dictation, discrepancies may still exist.          [1]   Allergies  Allergen Reactions    Amoxicillin HIVES

## 2024-10-20 NOTE — PLAN OF CARE
Problem: Patient Centered Care  Goal: Patient preferences are identified and integrated in the patient's plan of care  Description: Interventions:  - What would you like us to know as we care for you?   - Provide timely, complete, and accurate information to patient/family  - Incorporate patient and family knowledge, values, beliefs, and cultural backgrounds into the planning and delivery of care  - Encourage patient/family to participate in care and decision-making at the level they choose  - Honor patient and family perspectives and choices  Outcome: Progressing     Problem: GASTROINTESTINAL - ADULT  Goal: Minimal or absence of nausea and vomiting  Description: INTERVENTIONS:  - Maintain adequate hydration with IV or PO as ordered and tolerated  - Nasogastric tube to low intermittent suction as ordered  - Evaluate effectiveness of ordered antiemetic medications  - Provide nonpharmacologic comfort measures as appropriate  - Advance diet as tolerated, if ordered  - Obtain nutritional consult as needed  - Evaluate fluid balance  Outcome: Progressing  Goal: Maintains adequate nutritional intake (undernourished)  Description: INTERVENTIONS:  - Monitor percentage of each meal consumed  - Identify factors contributing to decreased intake, treat as appropriate  - Assist with meals as needed  - Monitor I&O, WT and lab values  - Obtain nutritional consult as needed  - Optimize oral hygiene and moisture  - Encourage food from home; allow for food preferences  - Enhance eating environment  Outcome: Progressing     Pt received from ED reporting dysphagia. Vitals stable. Clear liquids. Pt able to tolerate small amounts of clears. Plan for EGD tomorrow. Discussed plan of care with pt and father at bedside.

## 2024-10-20 NOTE — H&P
[facility]  part of [facility]  HISTORY AND PHYSICAL       Elza Rebecca Jones Patient Status:  Observation    1985  39 year old CSN [id number]   Location 503/503-A Attending Jay Logan MD     PCP Geni Barron MD     ASSESSMENT/PLAN    Esophageal dysphagia.  Differential is broad.  No anatomic abnormality seen on CT.  -GI consult  -EGD 10/21  -N.p.o. after midnight  -Clear liquid diet for now  -Pepcid twice daily  -IV fluids    Hypercalcemia.  Mild.  Probably related to relative dehydration.  -IV fluids  -Recheck in the morning    Developmental delay.  Manage expectantly    None anion gap acidosis.  -IV fluids.  -Repeat chemistry in the morning    Childhood history of juvenile dermatomyositis    ----------------------------------  dvt prophylaxis: sc heparin  code status: full code  dispo: home upon medical clearance    DATE OF ADMISSION: 10/20/24    CHIEF COMPLAINT: Difficulty swallowing    HISTORY OF PRESENT ILLNESS  This is a 39 year oldfemale with developmental delay brought in by father because of complaints of difficulty swallowing for 5 days.  Patient describes it as a choking sensation though her contribution to history is relatively limited.  She may have some discomfort with swallowing as well.  Has been able to drink water but more recently not food.  Symptoms have continued and mother brought patient in for evaluation.  There is mention from ER note of evaluation for dyspepsia in late , recommend EGD.  I could not find these notes to confirm.    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5, now in remission        PAST SURGICAL HISTORY  Past Surgical History:   Procedure Laterality Date    Deep muscle biopsy      thigh       ALLERGIES   Amoxicillin    MEDICATIONS  Current Discharge Medication List        CONTINUE these medications which have NOT CHANGED    Details   FAMOTIDINE 20 MG  Oral Tab TAKE ONE TABLET BY MOUTH TWICE DAILY AS NEEDED FOR HEARTBURN  Qty: 180 tablet, Refills: 0    Associated Diagnoses: Gastroesophageal reflux disease, unspecified whether esophagitis present      ondansetron 4 MG Oral Tablet Dispersible Take 1 tablet (4 mg total) by mouth every 8 (eight) hours as needed for Nausea.  Qty: 30 tablet, Refills: 0    Associated Diagnoses: Nausea             SOCIAL HISTORY  Social History     Socioeconomic History    Marital status: Single   Tobacco Use    Smoking status: Never     Passive exposure: Never    Smokeless tobacco: Never   Vaping Use    Vaping status: Never Used   Substance and Sexual Activity    Alcohol use: Yes    Drug use: Never       FAMILY HISTORY  Family History   Problem Relation Age of Onset    Breast Cancer Mother         breast ca    Diabetes Father     Lipids Father     Depression Brother     Stroke Maternal Grandmother     Colon Cancer Neg     Ovarian Cancer Neg        REVIEW OF SYSTEMS  Gen: Neg for chills, fever, diaphoresis and fatigue.  No weight loss or weight gain.  Endo: No heat or cold intolerance.  No polyuria.  HEENT: Neg for trouble swallowing, visual disturbance.  no hearing changes, no speech difficulties, no neck stiffness.  Resp: Neg for cough, shortness of breath and wheezing.    CV: Neg for chest pain and palpitations.   GI: As above  : Neg for dysuria, frequency and hematuria.   MS :Neg for back pain and joint pain.  No swelling, open wounds  Skin: Neg for rash.   Neuro: Neg for dizziness, focal weakness, LH, HA.   Psych: Neg for suicidal ideas, confusion, agitation and depressed mood.   Other: All other review systems negative except what is mentioned in the HPI    PHYSICAL EXAM  Vital signs:  /64 (BP Location: Right arm)   Pulse 99   Temp 97.2 °F (36.2 °C) (Oral)   Resp 12   Ht 5' 2\" (1.575 m)   Wt 122 lb 14.4 oz (55.7 kg)   LMP 10/06/2024 (Exact Date)   SpO2 98%   BMI 22.48 kg/m²   Gen: A+Ox3.  No distress.   HEENT: NCAT,  neck supple, no carotid bruit.  CV: RRR, S1S2, and intact distal pulses. No gallop, rub, murmur.    Pulm: Effort and breath sounds normal. No distress, wheezes, rales, rhonchi.  Abd: Soft, NTND, BS normal, no mass, no HSM, no rebound/guarding.   Neuro: Normal reflexes, cranial nerves II through XII intact, strength is symmetric and 5 out of 5 throughout, sensory exam grossly intact, coordination and gait normal.  Skin: Skin is warm and dry. No rashes, erythema, diaphoresis.   MS: No open wounds, no joint effusions.  No edema  Psych: Normal mood and affect. Behavior and judgment normal.     Data:  Recent Labs   Lab 10/20/24  0741   RBC 4.27   HGB 13.1   HCT 38.0   MCV 89.0   MCH 30.7   MCHC 34.5   RDW 13.2   NEPRELIM 6.45   WBC 10.3   .0     Recent Labs   Lab 10/20/24  0741   GLU 92   BUN 18   CREATSERUM 0.87   CA 10.5*   ALB 5.3*      K 3.7      CO2 18.0*   ALKPHO 80   AST 18   ALT 12   BILT 0.7   TP 8.3*       I personally reviewed the available laboratories, imaging including CT. I discussed the case with ER physician. I ordered laboratories, studies including chemistry. I adjusted medications including Pepcid. Medical decision making high, risk is high  >75min spent, >50% spent counseling and coordinating care in the form of educating pt/family and d/w consultants and staff. Most of the time spent discussing the above plan.

## 2024-10-21 ENCOUNTER — ANESTHESIA EVENT (OUTPATIENT)
Dept: ENDOSCOPY | Facility: HOSPITAL | Age: 39
End: 2024-10-21
Payer: MEDICARE

## 2024-10-21 ENCOUNTER — ANESTHESIA (OUTPATIENT)
Dept: ENDOSCOPY | Facility: HOSPITAL | Age: 39
End: 2024-10-21
Payer: MEDICARE

## 2024-10-21 VITALS
OXYGEN SATURATION: 100 % | HEIGHT: 62 IN | SYSTOLIC BLOOD PRESSURE: 135 MMHG | DIASTOLIC BLOOD PRESSURE: 88 MMHG | WEIGHT: 125.38 LBS | RESPIRATION RATE: 18 BRPM | BODY MASS INDEX: 23.07 KG/M2 | HEART RATE: 80 BPM | TEMPERATURE: 98 F

## 2024-10-21 LAB
ANION GAP SERPL CALC-SCNC: 14 MMOL/L (ref 0–18)
BUN BLD-MCNC: 8 MG/DL (ref 9–23)
BUN/CREAT SERPL: 9.6 (ref 10–20)
CALCIUM BLD-MCNC: 9.5 MG/DL (ref 8.7–10.4)
CHLORIDE SERPL-SCNC: 111 MMOL/L (ref 98–112)
CO2 SERPL-SCNC: 13 MMOL/L (ref 21–32)
CREAT BLD-MCNC: 0.83 MG/DL
DEPRECATED RDW RBC AUTO: 45.6 FL (ref 35.1–46.3)
EGFRCR SERPLBLD CKD-EPI 2021: 91 ML/MIN/1.73M2 (ref 60–?)
ERYTHROCYTE [DISTWIDTH] IN BLOOD BY AUTOMATED COUNT: 13.4 % (ref 11–15)
GLUCOSE BLD-MCNC: 61 MG/DL (ref 70–99)
GLUCOSE BLDC GLUCOMTR-MCNC: 113 MG/DL (ref 70–99)
GLUCOSE BLDC GLUCOMTR-MCNC: 63 MG/DL (ref 70–99)
HCT VFR BLD AUTO: 37.8 %
HGB BLD-MCNC: 12.6 G/DL
MCH RBC QN AUTO: 31 PG (ref 26–34)
MCHC RBC AUTO-ENTMCNC: 33.3 G/DL (ref 31–37)
MCV RBC AUTO: 92.9 FL
OSMOLALITY SERPL CALC.SUM OF ELEC: 282 MOSM/KG (ref 275–295)
PLATELET # BLD AUTO: 389 10(3)UL (ref 150–450)
POTASSIUM SERPL-SCNC: 4.3 MMOL/L (ref 3.5–5.1)
RBC # BLD AUTO: 4.07 X10(6)UL
SODIUM SERPL-SCNC: 138 MMOL/L (ref 136–145)
WBC # BLD AUTO: 9.6 X10(3) UL (ref 4–11)

## 2024-10-21 PROCEDURE — 99239 HOSP IP/OBS DSCHRG MGMT >30: CPT | Performed by: HOSPITALIST

## 2024-10-21 PROCEDURE — 43239 EGD BIOPSY SINGLE/MULTIPLE: CPT | Performed by: INTERNAL MEDICINE

## 2024-10-21 PROCEDURE — 0DB18ZX EXCISION OF UPPER ESOPHAGUS, VIA NATURAL OR ARTIFICIAL OPENING ENDOSCOPIC, DIAGNOSTIC: ICD-10-PCS | Performed by: INTERNAL MEDICINE

## 2024-10-21 PROCEDURE — 0DB38ZX EXCISION OF LOWER ESOPHAGUS, VIA NATURAL OR ARTIFICIAL OPENING ENDOSCOPIC, DIAGNOSTIC: ICD-10-PCS | Performed by: INTERNAL MEDICINE

## 2024-10-21 RX ORDER — SODIUM CHLORIDE, SODIUM LACTATE, POTASSIUM CHLORIDE, CALCIUM CHLORIDE 600; 310; 30; 20 MG/100ML; MG/100ML; MG/100ML; MG/100ML
INJECTION, SOLUTION INTRAVENOUS CONTINUOUS PRN
Status: DISCONTINUED | OUTPATIENT
Start: 2024-10-21 | End: 2024-10-21 | Stop reason: SURG

## 2024-10-21 RX ORDER — LIDOCAINE HYDROCHLORIDE 10 MG/ML
INJECTION, SOLUTION EPIDURAL; INFILTRATION; INTRACAUDAL; PERINEURAL AS NEEDED
Status: DISCONTINUED | OUTPATIENT
Start: 2024-10-21 | End: 2024-10-21 | Stop reason: SURG

## 2024-10-21 RX ORDER — DEXTROSE MONOHYDRATE AND SODIUM CHLORIDE 5; .45 G/100ML; G/100ML
INJECTION, SOLUTION INTRAVENOUS CONTINUOUS
Status: DISCONTINUED | OUTPATIENT
Start: 2024-10-21 | End: 2024-10-21

## 2024-10-21 RX ORDER — SODIUM CHLORIDE, SODIUM LACTATE, POTASSIUM CHLORIDE, CALCIUM CHLORIDE 600; 310; 30; 20 MG/100ML; MG/100ML; MG/100ML; MG/100ML
INJECTION, SOLUTION INTRAVENOUS CONTINUOUS
Status: DISCONTINUED | OUTPATIENT
Start: 2024-10-21 | End: 2024-10-21

## 2024-10-21 RX ADMIN — SODIUM CHLORIDE, SODIUM LACTATE, POTASSIUM CHLORIDE, CALCIUM CHLORIDE: 600; 310; 30; 20 INJECTION, SOLUTION INTRAVENOUS at 14:25:00

## 2024-10-21 RX ADMIN — LIDOCAINE HYDROCHLORIDE 40 MG: 10 INJECTION, SOLUTION EPIDURAL; INFILTRATION; INTRACAUDAL; PERINEURAL at 14:28:00

## 2024-10-21 NOTE — PLAN OF CARE
Problem: Patient Centered Care  Goal: Patient preferences are identified and integrated in the patient's plan of care  Description: Interventions:  - What would you like us to know as we care for you?   - Provide timely, complete, and accurate information to patient/family  - Incorporate patient and family knowledge, values, beliefs, and cultural backgrounds into the planning and delivery of care  - Encourage patient/family to participate in care and decision-making at the level they choose  - Honor patient and family perspectives and choices  Outcome: Completed        Problem: GASTROINTESTINAL - ADULT  Goal: Minimal or absence of nausea and vomiting  Description: INTERVENTIONS:  - Maintain adequate hydration with IV or PO as ordered and tolerated  - Nasogastric tube to low intermittent suction as ordered  - Evaluate effectiveness of ordered antiemetic medications  - Provide nonpharmacologic comfort measures as appropriate  - Advance diet as tolerated, if ordered  - Obtain nutritional consult as needed  - Evaluate fluid balance  Outcome: Completed  Goal: Maintains adequate nutritional intake (undernourished)  Description: INTERVENTIONS:  - Monitor percentage of each meal consumed  - Identify factors contributing to decreased intake, treat as appropriate  - Assist with meals as needed  - Monitor I&O, WT and lab values  - Obtain nutritional consult as needed  - Optimize oral hygiene and moisture  - Encourage food from home; allow for food preferences  - Enhance eating environment  Outcome: Completed     Went for EGD today. Vital signs are stable. Denies pain or discomfort. Patient tolerated diet well.  Plan for discharge back to home today. Discharge instruction given to patient and her father to the best of my ability.  PIV removed. Patient belongings returned to patient. Patient will be going to home with father.

## 2024-10-21 NOTE — DISCHARGE INSTRUCTIONS
If tissue was sampled/removed and you have not received your pathology results either by phone or letter within 2 weeks, please call our office at [phone].

## 2024-10-21 NOTE — PLAN OF CARE
Problem: Patient Centered Care  Goal: Patient preferences are identified and integrated in the patient's plan of care  Description: Interventions:  - What would you like us to know as we care for you? Dad at bedside  - Provide timely, complete, and accurate information to patient/family  - Incorporate patient and family knowledge, values, beliefs, and cultural backgrounds into the planning and delivery of care  - Encourage patient/family to participate in care and decision-making at the level they choose  - Honor patient and family perspectives and choices  Outcome: Progressing     Problem: Patient/Family Goals  Goal: Patient/Family Long Term Goal  Description: Patient's Long Term Goal: To go home    Interventions:  - EGD  - See additional Care Plan goals for specific interventions  Outcome: Progressing  Goal: Patient/Family Short Term Goal  Description: Patient's Short Term Goal: To rest    Interventions:   - Cluster care to maximize sleep   - See additional Care Plan goals for specific interventions  Outcome: Progressing     Problem: GASTROINTESTINAL - ADULT  Goal: Minimal or absence of nausea and vomiting  Description: INTERVENTIONS:  - Maintain adequate hydration with IV or PO as ordered and tolerated  - Nasogastric tube to low intermittent suction as ordered  - Evaluate effectiveness of ordered antiemetic medications  - Provide nonpharmacologic comfort measures as appropriate  - Advance diet as tolerated, if ordered  - Obtain nutritional consult as needed  - Evaluate fluid balance  Outcome: Progressing  Goal: Maintains adequate nutritional intake (undernourished)  Description: INTERVENTIONS:  - Monitor percentage of each meal consumed  - Identify factors contributing to decreased intake, treat as appropriate  - Assist with meals as needed  - Monitor I&O, WT and lab values  - Obtain nutritional consult as needed  - Optimize oral hygiene and moisture  - Encourage food from home; allow for food preferences  -  Enhance eating environment  Outcome: Progressing

## 2024-10-21 NOTE — OPERATIVE REPORT
ESOPHAGOGASTRODUODENOSCOPY (EGD) REPORT    Elza Jones     1985 Age 39 year old   PCP Geni Barron MD Endoscopist Lisa Ayala MD     Date of procedure: 10/21/24    Procedure: EGD w/biopsy    Pre-operative diagnosis: dysphagia    Post-operative diagnosis: see impression    Medications: MAC    Complications: none    Procedure:  Informed consent was obtained from the patient after the risks of the procedure were discussed, including but not limited to bleeding, perforation, aspiration, infection, or possibility of a missed lesion. After discussions of the risks/benefits and alternatives to this procedure, as well as the planned sedation, the patient was placed in the left lateral decubitus position and begun on continuous blood pressure pulse oximetry and EKG monitoring and this was maintained throughout the procedure. Once an adequate level of sedation was obtained a bite block was placed. Then the lubricated tip of the [id number] diagnostic video upper endoscope was inserted and advanced using direct visualization into the posterior pharynx and ultimately into the esophagus, stomach, and duodenum.    Complications: None    Findings:      1. Esophagus: normal Z line. The esophagus appeared normal throughout its entire length with no evidence of rings, webs or luminal narrowing. There was no evidence of erosions, ulcerations, varices, or masses.Random biopsies obtained in the proximal and distal esophagus with cold forceps for histology.     2. Stomach: We then entered the stomach. Gastric mucosa appeared normal in the forward view with no evidence of erythema, erosions, or ulcerations. There was no evidence of any luminal or submucosal masses. A retroflexed view allowed examination of the angularis, cardia and fundus and these areas also appeared normal with a non-patulous cardia. No hiatal hernia seen.     3. Duodenum: The duodenal mucosa appeared normal in the 1st and 2nd portion of the duodenum.      We then withdrew the instrument from the patient who tolerated the procedure well.     Impression:   1. Normal EGD, esophageal bx obtained     Recommend:  1. Follow-up pathology  2. Continue ppi bid  3. Ok to resume diet  4. Ok to discharge from my stance if tolerates diet    >>>If tissue was sampled/removed and you have not received your pathology results either by phone or letter within 2 weeks, please call our office at [phone].    Specimens:  Distal E, proximal E     Blood loss: <1 ml      Lisa Ayala MD  [facility]

## 2024-10-21 NOTE — ANESTHESIA POSTPROCEDURE EVALUATION
Patient: Elza Jones    Procedure Summary       Date: 10/21/24 Room / Location: [facility] ENDOSCOPY 05 / [facility] ENDOSCOPY    Anesthesia Start: 1425 Anesthesia Stop: 1441    Procedure: ESOPHAGOGASTRODUODENOSCOPY (EGD) Diagnosis: (normal EGD)    Surgeons: Lisa Ayala MD Anesthesiologist: Amita Miller MD    Anesthesia Type: MAC ASA Status: 2            Anesthesia Type: MAC    Vitals Value Taken Time   /63 10/21/24 1438   Temp 97.6 10/21/24 1441   Pulse 97 10/21/24 1441   Resp 22 10/21/24 1441   SpO2 98 % 10/21/24 1441   Vitals shown include unfiled device data.    [facility] AN Post Evaluation:   Patient Evaluated in PACU  Patient Participation: complete - patient participated  Level of Consciousness: awake and alert  Pain Management: adequateYes    Nausea/Vomiting: none  Cardiovascular Status: acceptable  Respiratory Status: acceptable  Postoperative Hydration acceptable      Amita Miller MD  10/21/2024 2:41 PM

## 2024-10-21 NOTE — INTERVAL H&P NOTE
Pre-op Diagnosis: dysphagia    The above referenced H&P was reviewed by Lisa Henriquez Ma, MD on 10/21/2024, the patient was examined and no significant changes have occurred in the patient's condition since the H&P was performed.  I discussed with the patient and/or legal representative the potential benefits, risks and side effects of this procedure; the likelihood of the patient achieving goals; and potential problems that might occur during recuperation.  I discussed reasonable alternatives to the procedure, including risks, benefits and side effects related to the alternatives and risks related to not receiving this procedure.  We will proceed with procedure as planned.

## 2024-10-21 NOTE — PLAN OF CARE
A&Ox4. Pt IV fluids infusing, voiding up to bathroom, NPO at midnight. Frequent rounding by nursing staff. Safety precautions maintained/call light within reach. Plan for EGD tomorrow. Report given to Cecilia CONTRERAS.    Problem: Patient Centered Care  Goal: Patient preferences are identified and integrated in the patient's plan of care  Description: Interventions:  - What would you like us to know as we care for you?  - Provide timely, complete, and accurate information to patient/family  - Incorporate patient and family knowledge, values, beliefs, and cultural backgrounds into the planning and delivery of care  - Encourage patient/family to participate in care and decision-making at the level they choose  - Honor patient and family perspectives and choices  Outcome: Progressing     Problem: Patient/Family Goals  Goal: Patient/Family Long Term Goal  Description: Patient's Long Term Goal: Discharge home    Interventions:  - discharge planning; medical clearance  - See additional Care Plan goals for specific interventions  Outcome: Progressing  Goal: Patient/Family Short Term Goal  Description: Patient's Short Term Goal: Swallow without pain    Interventions:   - Pain control, EGD  - See additional Care Plan goals for specific interventions  Outcome: Progressing     Problem: GASTROINTESTINAL - ADULT  Goal: Minimal or absence of nausea and vomiting  Description: INTERVENTIONS:  - Maintain adequate hydration with IV or PO as ordered and tolerated  - Nasogastric tube to low intermittent suction as ordered  - Evaluate effectiveness of ordered antiemetic medications  - Provide nonpharmacologic comfort measures as appropriate  - Advance diet as tolerated, if ordered  - Obtain nutritional consult as needed  - Evaluate fluid balance  Outcome: Progressing  Goal: Maintains adequate nutritional intake (undernourished)  Description: INTERVENTIONS:  - Monitor percentage of each meal consumed  - Identify factors contributing to decreased  intake, treat as appropriate  - Assist with meals as needed  - Monitor I&O, WT and lab values  - Obtain nutritional consult as needed  - Optimize oral hygiene and moisture  - Encourage food from home; allow for food preferences  - Enhance eating environment  Outcome: Progressing

## 2024-10-21 NOTE — ANESTHESIA PREPROCEDURE EVALUATION
Anesthesia PreOp Note    HPI:     Elza Jones is a 39 year old female who presents for preoperative consultation requested by: iLsa Ayala MD    Date of Surgery: 10/21/2024    Procedure(s):  ESOPHAGOGASTRODUODENOSCOPY (EGD)  Indication: dysphagia    Relevant Problems   No relevant active problems       NPO:  Last Liquid Consumption Date: 10/21/24  Last Liquid Consumption Time: 0000  Last Solid Consumption Date: 10/16/24  Last Solid Consumption Time: 1800  Last Liquid Consumption Date: 10/21/24          History Review:  Patient Active Problem List    Diagnosis Date Noted    Esophageal dysphagia 10/20/2024    Sore throat 01/02/2024    Nausea 01/02/2024    Abnormal mammogram of right breast 06/16/2023    Family history of breast cancer in mother 05/09/2023    Family history of familial hypercholesterolemia  07/11/2022    Right ear impacted cerumen 07/11/2022    Mental impairment 04/20/2021    Dermatomyositis (HCC) 04/20/2021    Vitamin D deficiency 04/20/2021       Past Medical History:    Cognitive developmental delay    JDMS (juvenile dermatomyositis) (HCC)    age 5, now in remission       Past Surgical History:   Procedure Laterality Date    Deep muscle biopsy  1991    thigh       Prescriptions Prior to Admission[1]  Current Medications and Prescriptions Ordered in Epic[2]    Allergies[3]    Family History   Problem Relation Age of Onset    Breast Cancer Mother         breast ca    Diabetes Father     Lipids Father     Depression Brother     Stroke Maternal Grandmother     Colon Cancer Neg     Ovarian Cancer Neg      Social History     Socioeconomic History    Marital status: Single   Tobacco Use    Smoking status: Never     Passive exposure: Never    Smokeless tobacco: Never   Vaping Use    Vaping status: Never Used   Substance and Sexual Activity    Alcohol use: Yes    Drug use: Never       Available pre-op labs reviewed.  Lab Results   Component Value Date    WBC 9.6 10/21/2024    RBC 4.07 10/21/2024     HGB 12.6 10/21/2024    HCT 37.8 10/21/2024    MCV 92.9 10/21/2024    MCH 31.0 10/21/2024    MCHC 33.3 10/21/2024    RDW 13.4 10/21/2024    .0 10/21/2024     Lab Results   Component Value Date     10/21/2024    K 4.3 10/21/2024     10/21/2024    CO2 13.0 (L) 10/21/2024    BUN 8 (L) 10/21/2024    CREATSERUM 0.83 10/21/2024    GLU 61 (L) 10/21/2024    PGLU 63 (L) 10/21/2024    CA 9.5 10/21/2024          Vital Signs:  Body mass index is 22.94 kg/m².   height is 1.575 m (5' 2\") and weight is 56.9 kg (125 lb 6.4 oz). Her oral temperature is 97.5 °F (36.4 °C). Her blood pressure is 141/75 and her pulse is 88. Her respiration is 20 and oxygen saturation is 95%.   Vitals:    10/20/24 1909 10/21/24 0559 10/21/24 0750 10/21/24 0826   BP: 154/73 135/70 141/75    Pulse: 93 80 88    Resp: 18 20 20    Temp: 97.3 °F (36.3 °C) 97.2 °F (36.2 °C) 97.5 °F (36.4 °C)    TempSrc: Oral Oral Oral    SpO2: 96% 99% 95%    Weight:    56.9 kg (125 lb 6.4 oz)   Height:            Anesthesia Evaluation     Patient summary reviewed and Nursing notes reviewed    No history of anesthetic complications   Airway   Mallampati: I  TM distance: >3 FB  Neck ROM: full  Comment: PROMINENT INCISORS  Dental          Pulmonary - normal exam   Cardiovascular - normal exam  Exercise tolerance: good  (-) hypertension, valvular problems/murmurs, past MI, CAD, CABG/stent, dysrhythmias    ROS comment: 10/2024 EKG  Sinus tachycardia with Premature atrial complexes   Minimal voltage criteria for LVH, may be normal variant ( Sokolow-Acevedo )   Borderline ECG   No previous ECGs found in [place]       Neuro/Psych      Comments: Developmental Delay    GI/Hepatic/Renal    (+) GERD    Comments: Dysphagia, difficulty swallowing     Endo/Other      Comments: Dermatomyositis  Abdominal  - normal exam                 Anesthesia Plan:   ASA:  2  Plan:   MAC  Informed Consent Plan and Risks Discussed With:  Patient      I have informed Elza Jones and/or  legal guardian or family member of the nature of the anesthetic plan, benefits, risks including possible dental damage if relevant, major complications, and any alternative forms of anesthetic management.   All of the patient's questions were answered to the best of my ability. The patient desires the anesthetic management as planned.  Amita Miller MD  10/21/2024 10:57 AM  Present on Admission:  **None**           [1]   Medications Prior to Admission   Medication Sig Dispense Refill Last Dose/Taking    FAMOTIDINE 20 MG Oral Tab TAKE ONE TABLET BY MOUTH TWICE DAILY AS NEEDED FOR HEARTBURN 180 tablet 0 10/19/2024 Evening    ondansetron 4 MG Oral Tablet Dispersible Take 1 tablet (4 mg total) by mouth every 8 (eight) hours as needed for Nausea. 30 tablet 0 10/19/2024 Evening   [2]   Current Facility-Administered Medications Ordered in Epic   Medication Dose Route Frequency Provider Last Rate Last Admin    dextrose 5%-sodium chloride 0.45% infusion   Intravenous Continuous Jay Logan MD 83 mL/hr at 10/21/24 0751 New Bag at 10/21/24 0751    acetaminophen (Tylenol Extra Strength) tab 500 mg  500 mg Oral Q4H PRN Jay Logan MD        ondansetron (Zofran) 4 MG/2ML injection 4 mg  4 mg Intravenous Q6H PRN Jay Logan MD        pantoprazole (Protonix) 40 mg in sodium chloride 0.9% PF 10 mL IV push  40 mg Intravenous Q12H Manuel Villalpando MD   40 mg at 10/21/24 0802     No current [facility]-ordered outpatient medications on file.   [3]   Allergies  Allergen Reactions    Amoxicillin HIVES

## 2024-10-21 NOTE — DISCHARGE SUMMARY
[facility]  part of [facility]     DISCHARGE SUMMARY     Elza Jones Patient Status:  Observation    1985 MRN [id number]   Location [facility] ENDOSCOPY LAB SUITES Attending Jay Logan MD   Hosp Day # 0 PCP Geni Barron MD     DATE OF ADMISSION: 10/20/2024  DATE OF DISCHARGE:  10/21/24  DISPOSITION: home  CONDITION ON DISCHARGE: good    DISCHARGE DIAGNOSES:    Esophageal dysphagia    HISTORY OF PRESENT ILLNESS (COPIED FROM ADMISSION H&P)  This is a 39 year oldfemale with developmental delay brought in by father because of complaints of difficulty swallowing for 5 days.  Patient describes it as a choking sensation though her contribution to history is relatively limited.  She may have some discomfort with swallowing as well.  Has been able to drink water but more recently not food.  Symptoms have continued and mother brought patient in for evaluation.  There is mention from ER note of evaluation for dyspepsia in late , recommend EGD.  I could not find these notes to confirm.     HOSPITAL COURSE:  Patient was admitted, was not in any distress.  Kept on IV fluids.  Given acid suppression, limited diet.  Taken on 10/21 for EGD which was essentially normal.  CT scan was likewise normal without evidence of anatomic obstruction of any type.  At this point favor functional, subjective issue.  However we will continue to monitor symptoms as an outpatient.  She will have follow-up with her primary care physician in about 1 week to discuss further testing and workup if necessary.    Patient understands return to the emergency room for increased pain, fever, discharge, shortness of breath, chest pain, new neurologic symptoms, other concerning symptoms.    PHYSICAL EXAM:  Temp:  [97.2 °F (36.2 °C)-98.3 °F (36.8 °C)] 97.5 °F (36.4 °C)  Pulse:  [] 77  Resp:  [14-20] 17  BP: ()/(54-75) 105/68  SpO2:  [95 %-100 %] 100 %  Gen: A+Ox3.  No distress.   HEENT: NCAT, neck  supple, no carotid bruit.  CV: RRR, S1S2, and intact distal pulses. No gallop, rub, murmur.  Pulm: Effort and breath sounds normal. No distress, wheezes, rales, rhonchi.  Abd: Soft, NTND, BS normal, no mass, no HSM, no rebound/guarding.   Neuro: Normal reflexes, CN. Sensory/motor exams grossly normal deficit. Coordination  and gait normal.   MS: No joint effusions.  No peripheral edema.  Skin: Skin is warm and dry. No rashes, erythema, diaphoresis.   Psych: Normal mood and affect. Behavior and judgment normal.     DISCHARGE MEDICATIONS     Discharge Medications        CONTINUE taking these medications        Instructions Prescription details   famotidine 20 MG Tabs  Commonly known as: Pepcid      TAKE ONE TABLET BY MOUTH TWICE DAILY AS NEEDED FOR HEARTBURN   Quantity: 180 tablet  Refills: 0     ondansetron 4 MG Tbdp  Commonly known as: Zofran-ODT      Take 1 tablet (4 mg total) by mouth every 8 (eight) hours as needed for Nausea.   Quantity: 30 tablet  Refills: 0              CONSULTANTS      FOLLOW UP:  Geni Barron MD  [address] [place] [id number]  [phone]    Follow up in 1 week(s)  Post Discharge Followup    The above plan and follow-up instructions were reviewed with the patient and they verbalized understanding and agreement.  They understand to return to the emergency room for any concerning signs or symptoms.  Greater than 30 minutes spent on discharge.  -----------------------    Hospital Discharge Diagnoses:    Esophageal dysphagia    Lace+ Score: 53  59-90 High Risk  29-58 Medium Risk  0-28   Low Risk.    TCM Follow-Up Recommendation:  LACE 29-58: Moderate Risk of readmission after discharge from the hospital.

## 2024-10-22 ENCOUNTER — TELEPHONE (OUTPATIENT)
Facility: CLINIC | Age: 39
End: 2024-10-22

## 2024-10-22 LAB
ATRIAL RATE: 104 BPM
P AXIS: -16 DEGREES
P-R INTERVAL: 128 MS
Q-T INTERVAL: 334 MS
QRS DURATION: 82 MS
QTC CALCULATION (BEZET): 439 MS
R AXIS: 44 DEGREES
T AXIS: 2 DEGREES
VENTRICULAR RATE: 104 BPM

## 2024-10-22 NOTE — TELEPHONE ENCOUNTER
RN called and spoke to pt's father. Pt scheduled for clinic appt on 11/1/24. Date, time, and location verified with father; he verbalized understanding.    Your Appointments        Friday November 01, 2024 10:00 AM  Follow Up Visit with TRACE Pederson  [facility] ([facility]) [address] [address]  [phone]

## 2024-10-23 ENCOUNTER — PATIENT OUTREACH (OUTPATIENT)
Dept: CASE MANAGEMENT | Age: 39
End: 2024-10-23

## 2024-10-23 ENCOUNTER — TELEPHONE (OUTPATIENT)
Facility: CLINIC | Age: 39
End: 2024-10-23

## 2024-10-23 DIAGNOSIS — R13.19 ESOPHAGEAL DYSPHAGIA: ICD-10-CM

## 2024-10-23 DIAGNOSIS — R13.19 ESOPHAGEAL DYSPHAGIA: Primary | ICD-10-CM

## 2024-10-23 DIAGNOSIS — Z02.9 ENCOUNTERS FOR ADMINISTRATIVE PURPOSE: Primary | ICD-10-CM

## 2024-10-23 PROCEDURE — 1111F DSCHRG MED/CURRENT MED MERGE: CPT

## 2024-10-23 NOTE — PROGRESS NOTES
Transitional Care Management   Discharge Date: 10/21/24  Contact Date: 10/23/2024    Assessment:      TCM Initial Assessment    General:  Assessment completed with: Patient  Patient Subjective: Spoke with patient's father Rm. Rm states he feels the patient is doing ok. He states everything is coming along. Rm states right now they are working on the swallowing. Rm states since the patient has been home from the hospital the patient has been eating a softer diet-- mostly eating jello, yogurt, applesauce. Rm states he tried to advance her diet a little more yesterday and gave her mashed potatoes and she tolerated this without incident. Also drinking water and Gatorade. Small bites and sips. Rm denies the patient having any chest pain, shortness of breath, cough, vomiting, choking, or a wet sounding voice during or after eating. Rm wants to know what type of diet she needs to be on now? Can he start advancing the foods he is giving her? What about a supplemental drink like ensure? Rm states he wants to try to give her more foods. (See RN intervention below).  Chief Complaint: Esophageal dysphagia  Verify patient name and  with patient/ caregiver: Yes    Hospital Stay/Discharge:  Tell me what you understand of why you were in the hospital or emergency department: difficulty swallowing  Prior to leaving the hospital were your Discharge Instructions reviewed with you?: Yes  Did you receive a copy of your written Discharge Instructions?: Yes  What questions do you have about your Discharge Instructions?: recommended diet  Do you feel better or worse since you left the hospital or emergency department?: Same    Follow - Up Appointment:  Do you have a follow-up appointment?: Yes  Date: 24  Physician: Hallie Newton  Are there any barriers to getting to your follow-up appointment?: No    Home Health/DME:  Prior to leaving the hospital was Home Health (HH) arranged for you?: N/A  Are HH needs identified  by staff during the assessment?: No     Prior to leaving the hospital or emergency department was Durable Medical Equipment (DME), medical supplies, or infusions arranged for you?: N/A  Are DME/medical supply/infusions needs identified by staff during this assessment?: No     Medications/Diet:  Did any of your medications change, during or after your hospital stay or ED visit?: No  Do you understand what your medications are for and possible side effects?: Yes  Are there any reasons that keep you from taking your medication as prescribed?: No  Any concerns about medication refills?: No    Were you given a different diet per your Discharge Instructions?: No  Reason: NCM to clarify this     Questions/Concerns:  Do you have any questions or concerns that have not been discussed?: Yes         Nursing Interventions:    NCM reviewed/advise importance of aspiration precautions.     NC called Gastroenterology office and spoke with Ashanti regarding the dietary recommendations-- she is placing a message to clinical staff to follow up with the patient's father directly.     Gastroenterology appointment confirmed for 11/01/2024  TCM/HFU appointment confirmed for 10/29/2024    All d/c instructions reviewed with pt.  Reviewed when to call MD vs when to go to ER/call 911.  Educated pt on the importance of taking all meds as prescribed as well as close f/u with PCP/specialists.  Pt verbalized understanding and will contact office with any further questions or concerns.         To help ease the symptoms of dysphagia:  Take any medicine you’ve been given exactly as directed. Ask for thickened liquid medicines if you need them.  To make eating easier:  Eat slowly.   Eat in a relaxed setting.  Don’t talk while you eat.  Take small bites. Chew slowly and completely before you swallow.  Sit upright during and after meals. Chewing food releases enzymes in your mouth that start the digestive process. Chew soft foods at least 5 to10 times.  Chew more dense food such as meats and vegetables up to 30 times before swallowing. Count the number of times you chew until you get a sense of how soft the food needs to be before swallowing.  Don't eat dry bread products or meat fibers.  Puree solid foods if needed. Thicken liquids with milk, juice, broth, gravy, or starch to make them easier to swallow.  Ask your healthcare provider if a liquid diet may be better for you.  Ask for a referral to a nutritionist if you are losing weight or having trouble getting enough food each day      Medication Review:   Current Outpatient Medications   Medication Sig Dispense Refill    FAMOTIDINE 20 MG Oral Tab TAKE ONE TABLET BY MOUTH TWICE DAILY AS NEEDED FOR HEARTBURN 180 tablet 0    ondansetron 4 MG Oral Tablet Dispersible Take 1 tablet (4 mg total) by mouth every 8 (eight) hours as needed for Nausea. 30 tablet 0     Did patient review medications using current pill bottles and not just a medication list?  Yes  Discharge medications reviewed/discussed/and reconciled against outpatient medications with patient.  Any changes or updates to medications sent to primary care provider.  Patient Acknowledged      Follow-up Appointments:  Your appointments       Date & Time Appointment Department (Center)    Oct 29, 2024 8:20 AM T Hospital Follow Up with Geni Barron MD [facility] ([facility])        Nov 01, 2024 10:00 AM [facility] Follow Up Visit with Hallie Brito APRN [facility] ([facility])              [address] [address]  [phone] [place] [address] [address] [facility] [address]  [facility] [id number] [phone]            Transitional Care Clinic  Was TCC Ordered: No      Primary  Care Provider (If no TCC appointment)  Does patient already have a PCP appointment scheduled? Yes  Nurse Care Manager Confirmed PCP office TCM appointment with patient      Specialist  Does the patient have any other follow-up appointment(s) that need to be scheduled? No   -If yes: Nurse Care Manager reviewed upcoming specialist appointments with patient: Yes   -Does the patient need assistance scheduling appointment(s): No    CCM referral placed:  Not Applicable

## 2024-10-23 NOTE — TELEPHONE ENCOUNTER
Marlene, nurse care  for patient. Patient is currently on diet that includes Jello, apple sauce and asking if she can take a supplemental drink, such as Ensure. Please call patients father, Rm [phone],thanks.

## 2024-10-25 RX ORDER — PANTOPRAZOLE SODIUM 40 MG/1
40 TABLET, DELAYED RELEASE ORAL
Qty: 90 TABLET | Refills: 3 | Status: SHIPPED | OUTPATIENT
Start: 2024-10-25 | End: 2025-10-20

## 2024-10-25 NOTE — TELEPHONE ENCOUNTER
Ashvin Sterling    Called and spoke to the patient's father due to message below, Rm (HIPAA authorized), he verified her date of birth and name.    He stated the patient's appetite is poor, unable to tolerate soft foods like mashed potato due to difficulty swallowing but she can tolerate liquids.    Advised to take Ensure if she is able to tolerate which he agreed.    S/P EGD on 10/21/2024 with recommendations to continue PPI.  Currently not taking PPI, only famotidine prn per Rm.    Please advise. Follow up OV scheduled on 10/30/2024    Thank you

## 2024-10-29 ENCOUNTER — OFFICE VISIT (OUTPATIENT)
Dept: INTERNAL MEDICINE CLINIC | Facility: CLINIC | Age: 39
End: 2024-10-29

## 2024-10-29 VITALS
HEIGHT: 62 IN | HEART RATE: 88 BPM | BODY MASS INDEX: 21.9 KG/M2 | DIASTOLIC BLOOD PRESSURE: 82 MMHG | SYSTOLIC BLOOD PRESSURE: 130 MMHG | WEIGHT: 119 LBS

## 2024-10-29 DIAGNOSIS — N64.9 BREAST LESION: ICD-10-CM

## 2024-10-29 DIAGNOSIS — R13.10 DYSPHAGIA, UNSPECIFIED TYPE: Primary | ICD-10-CM

## 2024-10-29 PROCEDURE — 99495 TRANSJ CARE MGMT MOD F2F 14D: CPT | Performed by: INTERNAL MEDICINE

## 2024-10-29 NOTE — PROGRESS NOTES
Subjective:   Elza Jones is a 39 year old female who presents for hospital follow up.   She was discharged from [facility] to Home or Self Care  Admission Date: 10/20/24   Discharge Date: 10/21/24  Hospital Discharge Diagnosis: Dysphagia     Interactive contact within 2 business days post discharge first initiated on Date: 10/23/2024    During the visit, the following was completed:  Obtained and reviewed discharge summary, continuity of care documents, and Hospitalist notes  Reviewed Labs (CBC, CMP)    HPI: 39 year oldfemale with developmental delay brought in by father because of complaints of difficulty swallowing for 5 days.  Patient describes it as a choking sensation though her contribution to history is relatively limited.  She may have some discomfort with swallowing as well.  Has been able to drink water but more recently not food.  Symptoms have continued and mother brought patient in for evaluation.  There is mention from ER note of evaluation for dyspepsia in late 2023, recommend EGD.  I could not find these notes to confirm.   Once discharged she advanced diet as tolerated , will see the GI dr tomorrow   Ate  Kael fettuccine Kael and French fries the other day and tolerated it well without any discomfort or feeling of blockage reviewed medication list and noted that she is taking the pantoprazole daily and also on the med list was the famotidine/Pepcid and the Zofran but she is not taking either 1 of those so took that off the med list      HISTORY  5/2023   had gone there for abdominal pain for about 1 week--This is, chronic and comes and goes for many years and currently for the past 2 weeks she is without any symptoms  Was referred to the vascular doctor but no appointment until August     HISTORY  July 2023  Had been diagnosed with dermatomyositis and was admitted at [facility] and seen by the immunologist Dr. 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 Shauna Gonzalez- dr yanez   ------------------------------------------------------------------------------------------------------------------    History/Other:   Current Medications:  Medication Reconciliation:  I am aware of an inpatient discharge within the last 30 days.  The discharge medication list has been reconciled with the patient's current medication list and reviewed by me.  See medication list for additions of new medication, and changes to current doses of medications and discontinued medications.  Outpatient Medications Marked as Taking for the 10/29/24 encounter (Office Visit) with Geni Barron MD   Medication Sig    pantoprazole 40 MG Oral Tab EC Take 1 tablet (40 mg total) by mouth every morning before breakfast.       Review of Systems  GENERAL: feels well otherwise  SKIN: denies any unusual skin lesions--still has a breast lesion on the right breast,, aware she still has to do the mammogram  EYES: denies blurred vision or double vision  HEENT: denies nasal congestion, sinus pain or ST  LUNGS: denies shortness of breath with exertion  CARDIOVASCULAR: denies chest pain on exertion  GI: denies abdominal pain, denies heartburn  : denies dysuria, vaginal discharge or itching, no complaint of urinary incontinence   MUSCULOSKELETAL: denies back pain  NEURO: denies headaches  PSYCHE: denies depression or anxiety  HEMATOLOGIC: denies hx of anemia  ENDOCRINE: denies thyroid history  ALL/ASTHMA: denies hx  of allergy or asthma    Objective:   Physical Exam  GENERAL: well developed, well nourished,in no apparent distress  SKIN rright breast lateral  aspect with half centimeter red lesion which blanches   HEENT: atraumatic, normocephalic  NECK: supple,no adenopathy,nontender   LUNGS: clear to auscultation, no wheeze  CARDIO: RRR without murmur  EXTREMITIES: no edema      /82   Pulse 88   Ht 5' 2\" (1.575 m)   Wt 119 lb (54 kg)   LMP 10/06/2024 (Exact Date)   BMI 21.77 kg/m²  Estimated body mass index is 21.77 kg/m² as calculated from the following:    Height as of this encounter: 5' 2\" (1.575 m).    Weight as of this encounter: 119 lb (54 kg).    Assessment & Plan:   1. Dysphagia, unspecified type (Primary)  Currently stable and without any symptoms and patient does appear To be tolerating food following a regular diet   Will see GI tomorrow     2. Breast lesion  -     Surg Onc Referral - In Network  Reminded patient and father to get the mammogram done and follow-up with the breast surgeon as her lesion still persists            Preventive medicine  Labs 10/2024 reviewed ,  Pap smear- page 7/2021  cscope - will refer due to family history possibly in father   Mammogram -family h/o breast cancer in mother       Return in 3 months (on 1/29/2025).

## 2024-10-30 ENCOUNTER — TELEPHONE (OUTPATIENT)
Dept: GASTROENTEROLOGY | Facility: CLINIC | Age: 39
End: 2024-10-30

## 2024-10-30 ENCOUNTER — OFFICE VISIT (OUTPATIENT)
Facility: CLINIC | Age: 39
End: 2024-10-30

## 2024-10-30 VITALS
HEART RATE: 98 BPM | DIASTOLIC BLOOD PRESSURE: 80 MMHG | SYSTOLIC BLOOD PRESSURE: 135 MMHG | BODY MASS INDEX: 21.9 KG/M2 | WEIGHT: 119 LBS | HEIGHT: 62 IN

## 2024-10-30 DIAGNOSIS — K21.00 GASTROESOPHAGEAL REFLUX DISEASE WITH ESOPHAGITIS WITHOUT HEMORRHAGE: ICD-10-CM

## 2024-10-30 DIAGNOSIS — Z83.710 FAMILY HISTORY OF ADENOMATOUS POLYP OF COLON: Primary | ICD-10-CM

## 2024-10-30 DIAGNOSIS — Z83.710 FAMILY HISTORY OF ADENOMATOUS AND SERRATED POLYPS: ICD-10-CM

## 2024-10-30 DIAGNOSIS — Z12.11 COLON CANCER SCREENING: Primary | ICD-10-CM

## 2024-10-30 DIAGNOSIS — K21.9 GASTROESOPHAGEAL REFLUX DISEASE, UNSPECIFIED WHETHER ESOPHAGITIS PRESENT: ICD-10-CM

## 2024-10-30 DIAGNOSIS — Z12.11 COLON CANCER SCREENING: ICD-10-CM

## 2024-10-30 PROCEDURE — 99214 OFFICE O/P EST MOD 30 MIN: CPT

## 2024-10-30 RX ORDER — SODIUM, POTASSIUM,MAG SULFATES 17.5-3.13G
SOLUTION, RECONSTITUTED, ORAL ORAL
Qty: 1 EACH | Refills: 0 | Status: SHIPPED | OUTPATIENT
Start: 2024-10-30

## 2024-10-30 NOTE — TELEPHONE ENCOUNTER
Scheduled for:  COLONOSCOPY [id number]  Provider Name:  DR VALDOVINOS  Date: 2/4/25    Location:    [facility]  Sedation:  MAC  Time:  (pt is aware that CF will call the day before to confirm arrival time)  Prep:  SUPREP  Meds/Allergies Reconciled?:  AYSHA TAYLOR NP  Diagnosis with codes:  Family history of adenomatous polyp of colon Z83.710  Colon cancer screening Z12.11  Was patient informed to call insurance with codes (Y/N):  Yes, I confirmed  insurance with the patient.   Referral sent?:  N/A  [facility] or [facility] notified?:  I sent an electronic request to Endo Scheduling and received a confirmation today.      Medication Orders:  This patient verbally confirmed that she is not taking:  Iron, blood thinners, BP meds, and is not diabetic  Not latex allergy, PCN allergy and does not have a pacemaker     Misc Orders:       Further instructions given by staff:

## 2024-10-30 NOTE — PATIENT INSTRUCTIONS
-continue pantoprazole 40mg daily first thing in the morning    -can advance diet to general diet    -follow up in 6 months      1. Schedule colonoscopy with General Pool Endoscopist  Diagnosis: family history of adenoma polyps, CRC screen  Sedation: MAC  Prep: split dose suprep    2.  bowel prep from pharmacy   You can pick the bowel prep up now and store in a cool, dry place in your home until your scheduled bowel prep start date.    3. Continue all medications as normal for your procedure. DO NOT TAKE: Any form of alcohol, recreational drugs and any forms of erectile dysfunction medications 24 hours prior to procedure.    4. Read all bowel prep instructions carefully. Bowel prep instructions can also be found online at:  www.[facility].org/giprep     5. AVOID seeds, nuts, popcorn, raw fruits and vegetables for 5 days before procedure    6. If you start any NEW medication after your visit today, please notify us. Certain medications (like iron or weight loss medications) will need to be held before the procedure, or the procedure cannot be performed safely.

## 2024-11-04 NOTE — TELEPHONE ENCOUNTER
Requested Prescriptions     Pending Prescriptions Disp Refills    FAMOTIDINE 20 MG Oral Tab [Pharmacy Med Name: Famotidine Oral Tablet 20 MG] 180 tablet 0     Sig: Take 1 tablet by mouth twice daily as needed for heartburn         LOV  10/30/2024 dutch/ Hallie BENITEZ  10/20/2024    em

## 2024-11-05 RX ORDER — FAMOTIDINE 20 MG/1
20 TABLET, FILM COATED ORAL 2 TIMES DAILY PRN
Qty: 180 TABLET | Refills: 0 | OUTPATIENT
Start: 2024-11-05

## 2024-11-07 ENCOUNTER — HOSPITAL ENCOUNTER (OUTPATIENT)
Dept: MAMMOGRAPHY | Facility: HOSPITAL | Age: 39
Discharge: HOME | End: 2024-11-07
Payer: MEDICARE

## 2024-11-07 DIAGNOSIS — Z12.31 SCREENING MAMMOGRAM FOR BREAST CANCER: ICD-10-CM

## 2024-11-07 PROCEDURE — 77067 SCR MAMMO BI INCL CAD: CPT

## 2024-11-07 PROCEDURE — 77063 BREAST TOMOSYNTHESIS BI: CPT

## 2024-11-13 ENCOUNTER — LAB ENCOUNTER (OUTPATIENT)
Dept: LAB | Age: 39
End: 2024-11-13
Payer: MEDICARE

## 2024-11-13 ENCOUNTER — OFFICE VISIT (OUTPATIENT)
Dept: INTERNAL MEDICINE CLINIC | Facility: CLINIC | Age: 39
End: 2024-11-13

## 2024-11-13 VITALS
DIASTOLIC BLOOD PRESSURE: 74 MMHG | WEIGHT: 122 LBS | OXYGEN SATURATION: 100 % | SYSTOLIC BLOOD PRESSURE: 108 MMHG | BODY MASS INDEX: 22.45 KG/M2 | HEIGHT: 62 IN | HEART RATE: 66 BPM

## 2024-11-13 DIAGNOSIS — E55.9 VITAMIN D DEFICIENCY: ICD-10-CM

## 2024-11-13 DIAGNOSIS — K21.9 GASTROESOPHAGEAL REFLUX DISEASE, UNSPECIFIED WHETHER ESOPHAGITIS PRESENT: ICD-10-CM

## 2024-11-13 DIAGNOSIS — R13.10 DYSPHAGIA, UNSPECIFIED TYPE: ICD-10-CM

## 2024-11-13 DIAGNOSIS — R92.8 ABNORMAL MAMMOGRAM OF RIGHT BREAST: ICD-10-CM

## 2024-11-13 DIAGNOSIS — Z83.42 FAMILY HISTORY OF FAMILIAL HYPERCHOLESTEROLEMIA: ICD-10-CM

## 2024-11-13 DIAGNOSIS — I77.4 CELIAC ARTERY STENOSIS (HCC): ICD-10-CM

## 2024-11-13 DIAGNOSIS — Z80.3 FAMILY HISTORY OF BREAST CANCER IN MOTHER: ICD-10-CM

## 2024-11-13 DIAGNOSIS — Z00.00 ENCOUNTER FOR ANNUAL HEALTH EXAMINATION: Primary | ICD-10-CM

## 2024-11-13 DIAGNOSIS — Z00.00 ENCOUNTER FOR ANNUAL HEALTH EXAMINATION: ICD-10-CM

## 2024-11-13 DIAGNOSIS — F79 MENTAL IMPAIRMENT: ICD-10-CM

## 2024-11-13 DIAGNOSIS — Z12.4 CERVICAL CANCER SCREENING: ICD-10-CM

## 2024-11-13 DIAGNOSIS — F81.9 COGNITIVE DEVELOPMENTAL DELAY: ICD-10-CM

## 2024-11-13 DIAGNOSIS — M33.13 DERMATOMYOSITIS (HCC): ICD-10-CM

## 2024-11-13 LAB
CHOLEST SERPL-MCNC: 226 MG/DL (ref ?–200)
FASTING PATIENT LIPID ANSWER: YES
HDLC SERPL-MCNC: 45 MG/DL (ref 40–59)
LDLC SERPL CALC-MCNC: 163 MG/DL (ref ?–100)
NONHDLC SERPL-MCNC: 181 MG/DL (ref ?–130)
TRIGL SERPL-MCNC: 100 MG/DL (ref 30–149)
TSI SER-ACNC: 0.65 UIU/ML (ref 0.55–4.78)
VIT D+METAB SERPL-MCNC: 22.2 NG/ML (ref 30–100)
VLDLC SERPL CALC-MCNC: 20 MG/DL (ref 0–30)

## 2024-11-13 PROCEDURE — 36415 COLL VENOUS BLD VENIPUNCTURE: CPT

## 2024-11-13 PROCEDURE — G0439 PPPS, SUBSEQ VISIT: HCPCS

## 2024-11-13 PROCEDURE — 82306 VITAMIN D 25 HYDROXY: CPT

## 2024-11-13 PROCEDURE — 80061 LIPID PANEL: CPT

## 2024-11-13 PROCEDURE — 84443 ASSAY THYROID STIM HORMONE: CPT

## 2024-11-13 NOTE — PROGRESS NOTES
Subjective:   Elza Jones is a 39 year old female who presents for a Medicare Subsequent Annual Wellness visit (Pt already had Initial Annual Wellness) and scheduled follow up of multiple significant but stable problems.     Presents for annual   Was in hospital recently for dysphagia and had a normal CT scan and EGD, followed up with GI and symptoms are improved, on PPI    Mammogram - UTD   Pap smear - due, refer to OBGYN   Flu vaccine - declines     Plays basketball and bowling   Feels good when exercising   Just had a game yesterday and won     HISTORY  5/2023   had gone there for abdominal pain for about 1 week--This is, chronic and comes and goes for many years and currently for the past 2 weeks she is without any symptoms  Was referred to the vascular doctor but no appointment until August     HISTORY  July 2023  Had been diagnosed with dermatomyositis and was admitted at [facility] and seen by the immunologist Dr. 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 Shauna Gonzalez- dr yanez     History/Other:   Fall Risk Assessment:   She has been screened for Falls and is low risk.      Cognitive Assessment:   She had a completely normal cognitive assessment - see flowsheet entries     Functional Ability/Status:   Elza Jones has some abnormal functions as listed below:  She has Driving difficulties based on screening of functional status. She has difficulties Managing  Money/Bills based on screening of functional status.    Depression Screening (PHQ):  PHQ-2 SCORE: 0  , done 11/13/2024      Advanced Directives:   She does NOT have a Living Will. [Do you have a living will?: No]  She does have a POA but we do NOT have it on file in Epic.    Discussed Advance Care Planning with patient (and family/surrogate if present). Standard forms made available to patient in After Visit Summary.      Patient Active Problem List   Diagnosis    Mental impairment    Dermatomyositis (HCC)    Vitamin D deficiency    Family history of familial hypercholesterolemia     Right ear impacted cerumen    Family history of breast cancer in mother    Abnormal mammogram of right breast    Sore throat    Nausea    Esophageal dysphagia     Allergies:  She is allergic to amoxicillin.    Current Medications:  Outpatient Medications Marked as Taking for the 11/13/24 encounter (Office Visit) with Cecilia Eli APRN   Medication Sig    Na Sulfate-K Sulfate-Mg Sulf (SUPREP BOWEL PREP KIT) 17.5-3.13-1.6 GM/177ML Oral Solution Take as directed by GI clinic. Okay to substitute for generic.    pantoprazole 40 MG Oral Tab EC Take 1 tablet (40 mg total) by mouth every morning before breakfast.       Medical History:  She  has a past medical history of Cognitive developmental delay and JDMS (juvenile dermatomyositis) (Formerly [facility]).  Surgical History:  She  has a past surgical history that includes deep muscle biopsy (1991) and egd (N/A, 10/21/2024).   Family History:  Her family history includes Breast Cancer in her mother; Depression in her brother; Diabetes in her father; Lipids in her father; Stroke in her maternal grandmother.  Social History:  She  reports that she has never smoked. She has never been exposed to tobacco smoke. She has never used smokeless tobacco. She reports current alcohol use. She reports that she does not use drugs.    Tobacco:  She has never smoked tobacco.    CAGE Alcohol Screen:   CAGE screening score of  0 on 11/13/2024, showing low risk of alcohol abuse.      Patient Care Team:  Geni Barron MD as PCP - General (Internal Medicine)  Zara Sanderson MD (OBSTETRICS & GYNECOLOGY)  Marlene Ervin, BEN as [facility] TCM Care Manager (TCM Management)    Review of Systems  10 point review of systems otherwise negative with the exception of HPI and assessment and plan    Objective:   Physical Exam  Vitals reviewed.   Constitutional:       General: She is not in acute distress.     Appearance: Normal appearance. She is well-developed.   HENT:      Right Ear: Tympanic membrane normal.      Left Ear: Tympanic membrane normal.      Mouth/Throat:      Mouth: Mucous membranes are moist.      Pharynx: Oropharynx is clear.   Cardiovascular:      Rate and Rhythm: Normal rate and regular rhythm.      Heart sounds: Normal heart sounds.   Pulmonary:      Effort: Pulmonary effort is normal.      Breath sounds: Normal breath sounds.   Abdominal:      General: Bowel sounds are normal.      Palpations: Abdomen is soft.   Lymphadenopathy:      Cervical: No cervical adenopathy.   Skin:     General: Skin is warm and dry.   Neurological:      Mental Status: She is alert and oriented to person, place, and time.       /74   Pulse 66   Ht 5' 2\" (1.575 m)   Wt 122 lb (55.3 kg)   LMP 10/06/2024 (Exact Date)   SpO2 100%   BMI 22.31 kg/m²  Estimated body mass index is 22.31 kg/m² as calculated from the following:    Height as of this encounter: 5' 2\" (1.575 m).    Weight as of this encounter: 122 lb (55.3 kg).    Medicare Hearing Assessment:   Hearing Screening    Screening Method: Questionnaire  I have a problem hearing over the telephone: No I have trouble following the conversations when two or more people are talking at the same time: No   I have trouble understanding things on the TV: No I have to strain to understand conversations: No   I have to worry about missing the telephone ring or doorbell: No I have trouble hearing conversations  in a noisy background such as a crowded room or restaurant: No   I get confused about where sounds come from: No I misunderstand some words in a sentence and need to ask people to repeat themselves: No   I especially have trouble understanding the speech of women and children: No I have trouble understanding the speaker in a large room such as at a meeting or place of Mandaeism: No   Many people I talk to seem to mumble (or don't speak clearly): No People get annoyed because I misunderstand what they say: No   I misunderstand what others are saying and make inappropriate responses: No I avoid social activities because I cannot hear well and fear I will reply improperly: No   Family members and friends have told me they think I may have hearing loss: No                   Assessment & Plan:   Elza Jones is a 39 year old female who presents for a Medicare Assessment.     1. Encounter for annual health examination (Primary)  -     Lipid Panel; Future; Expected date: 11/13/2024  -     TSH W Reflex To Free T4; Future; Expected date: 11/13/2024  2. Dysphagia, unspecified type  Improved; f/u with GI  3. Cognitive developmental delay  Stable, lives with dad  4. Vitamin D deficiency  -     Vitamin D; Future; Expected date: 11/13/2024  5. Dermatomyositis (HCC)  No symptoms, has not seen rheumatology in a while  6. Celiac artery stenosis (HCC)  Saw vascular surgery   7. Gastroesophageal reflux disease, unspecified whether esophagitis present  Stable on PPI f/u with GI  8. Mental impairment  Stable, lives with dad  9. Family history of familial hypercholesterolemia   -     Lipid Panel; Future; Expected date: 11/13/2024  10. Abnormal mammogram of right breast  Mammogram UTD  11. Family history of breast cancer in mother  Mammogram UTD  12. Cervical cancer screening  -     OBG - INTERNAL  Will refer back to Dr. Sanderson     The patient indicates understanding of these issues and agrees to the plan.  Reinforced healthy diet,  lifestyle, and exercise.      No follow-ups on file.     Cecilia Sreedhar, TRACE, 11/13/2024     Supplementary Documentation:   General Health:  In the past six months, have you lost more than 10 pounds without trying?: 2 - No  Has your appetite been poor?: No  Type of Diet: Balanced  How does the patient maintain a good energy level?: Appropriate Exercise  How would you describe your daily physical activity?: Moderate  How do you maintain positive mental well-being?: Social Interaction;Puzzles;Games;Visiting Friends  On a scale of 0 to 10, with 0 being no pain and 10 being severe pain, what is your pain level?: 0 - (None)  In the past six months, have you experienced urine leakage?: 0-No  At any time do you feel concerned for the safety/well-being of yourself and/or your children, in your home or elsewhere?: No  Have you had any immunizations at another office such as Influenza, Hepatitis B, Tetanus, or Pneumococcal?: No    Health Maintenance   Topic Date Due    Annual Physical  07/11/2023    Pap Smear  07/09/2024    Influenza Vaccine (1) Never done    COVID-19 Vaccine (2 - 2024-25 season) 11/16/2024 (Originally 9/1/2024)    DTaP,Tdap,and Td Vaccines (1 - Tdap) 11/29/2024 (Originally 7/7/2003)    Mammogram  11/07/2025    Annual Depression Screening  Completed    Pneumococcal Vaccine: Birth to 64yrs  Aged Out

## 2024-11-14 DIAGNOSIS — E55.9 VITAMIN D DEFICIENCY: Primary | ICD-10-CM

## 2024-11-14 RX ORDER — ERGOCALCIFEROL 1.25 MG/1
50000 CAPSULE, LIQUID FILLED ORAL WEEKLY
Qty: 4 CAPSULE | Refills: 3 | Status: SHIPPED | OUTPATIENT
Start: 2024-11-14

## 2024-12-16 ENCOUNTER — OFFICE VISIT (OUTPATIENT)
Dept: SURGERY | Facility: CLINIC | Age: 39
End: 2024-12-16
Payer: MEDICARE

## 2024-12-16 VITALS
TEMPERATURE: 98 F | OXYGEN SATURATION: 98 % | HEART RATE: 84 BPM | DIASTOLIC BLOOD PRESSURE: 64 MMHG | WEIGHT: 123.63 LBS | BODY MASS INDEX: 23 KG/M2 | RESPIRATION RATE: 18 BRPM | SYSTOLIC BLOOD PRESSURE: 138 MMHG

## 2024-12-16 DIAGNOSIS — Z12.31 SCREENING MAMMOGRAM FOR BREAST CANCER: ICD-10-CM

## 2024-12-16 DIAGNOSIS — Z80.3 FAMILY HISTORY OF BREAST CANCER: Primary | ICD-10-CM

## 2024-12-16 PROCEDURE — 99213 OFFICE O/P EST LOW 20 MIN: CPT

## 2024-12-16 NOTE — PROGRESS NOTES
Breast Surgery Surveillance Visit    History of Present Illness:   Ms. Elza Jones is a 39 year old woman who presents with initial concern of left breast pain that prompted a diagnostic evaluation.  She has no personal prior history of breast disease or biopsies.  She denies any palpable masses, nipple discharge, skin changes or x-ray symptoms.  She does have a family history of breast cancer in her mom.  She had a bilateral diagnostic evaluation on 2023 that showed no concerning findings in the left breast within incidental focal asymmetry seen on the baseline right breast mammogram without a sonographic correlate for which a 6-month surveillance was recommended and took place. She noticed a red skin lesion to her right breast at the 11 o'clock position 8cm from the nipple. This has remained stable. Most recent imaging was a bilateral screening mammogram on 2024 which showed no suspicious findings. She is here today for evaluation and recommendations for further therapy.        Past Medical History:    Cognitive developmental delay    JDMS (juvenile dermatomyositis) (Formerly [facility])    age 5, now in remission       Past Surgical History:   Procedure Laterality Date    Deep muscle biopsy      thigh    Egd N/A 10/21/2024    ; normal exam       Gynecological History:  Pt is a   She achieved menarche at age 11 and LMP 2023  She denies any history of hormone replacement therapy   She denies any history of oral contraceptive use   She denies infertility treatment to achieve pregnancy.    Medications:     ergocalciferol 1.25 MG (99277 UT) Oral Cap Take 1 capsule (50,000 Units total) by mouth once a week. 4 capsule 3    pantoprazole 40 MG Oral Tab EC Take 1 tablet (40 mg total) by mouth every morning before breakfast. 90 tablet 3       Allergies:    Allergies   Allergen Reactions    Amoxicillin HIVES       Family History:   Family History   Problem Relation Age of Onset    Breast Cancer  Mother         breast ca    Diabetes Father     Lipids Father     Depression Brother     Stroke Maternal Grandmother     Colon Cancer Neg     Ovarian Cancer Neg        She is not of Ashkenazi Hindu ancestry.    Social History:  History   Alcohol Use    Yes       History   Smoking Status    Never   Smokeless Tobacco    Never     Ms. Elza Jones is Single with 0 children. She has 1 siblings. She is currently Employed part-time      Review of Systems:  General:   The patient denies, fever, chills, night sweats, fatigue, generalized weakness, change in appetite or weight loss.    HEENT:     The patient denies eye irritation, cataracts, redness, glaucoma, yellowing of the eyes, change in vision, color blindness, or wearing contacts/glasses. The patient denies hearing loss, ringing in the ears, ear drainage, earaches, nasal congestion, nose bleeds, snoring, pain in mouth/throat, hoarseness, change in voice, facial trauma.    Respiratory:  The patient denies chronic cough, phlegm, hemoptysis, pleurisy/chest pain, pneumonia, asthma, wheezing, difficulty in breathing with exertion, emphysema, chronic bronchitis, shortness of breath or abnormal sound when breathing.     Cardiovascular:  There is no history of chest pain, chest pressure/discomfort, palpitations, irregular heartbeat, fainting or near-fainting, difficulty breathing when lying flat, SOB/Coughing at night, swelling of the legs or chest pain while walking.    Breasts:  See history of present illness    Gastrointestinal:     There is no history of difficulty or pain with swallowing, reflux symptoms, vomiting, dark or bloody stools, constipation, yellowing of the skin, indigestion, nausea, change in bowel habits, diarrhea, +abdominal pain or vomiting blood.     Genitourinary:  The patient denies frequent urination, needing to get up at night to urinate, urinary hesitancy or retaining urine, painful urination, urinary incontinence, decreased urine stream,  blood in the urine or vaginal/penile discharge.    Skin:    The patient denies rash, itching, skin lesions, dry skin, change in skin color or change in moles.     Hematologic/Lymphatic:  The patient denies easily bruising or bleeding or persistent swollen glands or lymph nodes.     Musculoskeletal:  The patient denies muscle aches/pain, joint pain, stiff joints, neck pain, back pain or bone pain.    Neuropsychiatric:  There is no history of migraines or severe headaches, seizure/epilepsy, speech problems, coordination problems, trembling/tremors, fainting/black outs, dizziness, memory problems, loss of sensation/numbness, problems walking, weakness, tingling or burning in hands/feet. There is no history of abusive relationship, bipolar disorder, sleep disturbance, +anxiety, depression or feeling of despair.    Endocrine:    There is no history of poor/slow wound healing, weight loss/gain, fertility or hormone problems, cold intolerance, thyroid disease.     Allergic/Immunologic:  There is no history of hives, hay fever, angioedema or anaphylaxis.    /64 (BP Location: Right arm, Patient Position: Sitting, Cuff Size: adult)   Pulse 84   Temp 98.1 °F (36.7 °C) (Temporal)   Resp 18   Wt 56.1 kg (123 lb 9.6 oz)   LMP 10/06/2024 (Exact Date)   SpO2 98%   BMI 22.61 kg/m²     Physical Exam:  The patient is an alert, oriented, well-nourished and  well-developed woman who appears her stated age. Her speech patterns and movements are normal. Her affect is appropriate.    HEENT: The head is normocephalic. The neck is supple. The thyroid is not enlarged and is without palpable masses/nodules. There are no palpable masses. The trachea is in the midline. Conjunctiva are clear, non-icteric.    Chest: The chest expands symmetrically. The lungs are clear to auscultation.    Heart: The rhythm is regular.  There are no murmurs, rubs, gallops or thrills.    Breasts:  Her breasts are symmetrical.  Right breast: The skin,  nipple ,and areola appear normal. There is no skin dimpling with movement of the pectoralis. There is no nipple retraction. No nipple discharge can be elicited. The parenchyma is mildly nodular. There are no dominant masses in the breast. The axillary tail is normal. 4 mm dermal lesion at the 11 o'clock position.   Left breast:   The skin, nipple, and areola appear normal. There is no skin dimpling with movement of the pectoralis. There is no nipple retraction. No nipple discharge can be elicited. The parenchyma is mildly nodular. There are no dominant masses in the breast. The axillary tail is normal.    Abdomen:  The abdomen is soft, flat and non tender. The liver is not enlarged. There are no palpable masses.    Lymph Nodes:  The supraclavicular, axillary and cervical regions are free of significant lymphadenopathy.    Back: There is no vertebral column tenderness.    Skin: The skin appears normal. There are no suspicious appearing rashes or lesions.    Extremities: The extremities are without deformity, cyanosis or edema.    Impression:   Ms. Elza Jones is a 39 year old woman presents with family history of breast cancer.    Discussion and Plan:  I had a discussion with the Patient and her Father regarding her breast exam. On exam today I found stable findings. She will be due for a screening mammogram in November 2025 with a clinical exam to follow. I encouraged her to monitor her dermal lesion and to let me know if she notices any changes. She was given ample opportunity for questions and those questions were answered to her satisfaction. She has been  encouraged to contact the office with any questions or concerns prior to her next appointment.

## 2025-01-07 ENCOUNTER — OFFICE VISIT (OUTPATIENT)
Dept: OBGYN CLINIC | Facility: CLINIC | Age: 40
End: 2025-01-07

## 2025-01-07 VITALS
BODY MASS INDEX: 22.85 KG/M2 | SYSTOLIC BLOOD PRESSURE: 125 MMHG | DIASTOLIC BLOOD PRESSURE: 75 MMHG | HEART RATE: 94 BPM | WEIGHT: 124.19 LBS | HEIGHT: 62 IN

## 2025-01-07 DIAGNOSIS — Z12.4 SCREENING FOR CERVICAL CANCER: ICD-10-CM

## 2025-01-07 DIAGNOSIS — Z01.419 WELL WOMAN EXAM WITH ROUTINE GYNECOLOGICAL EXAM: Primary | ICD-10-CM

## 2025-01-07 PROCEDURE — 99214 OFFICE O/P EST MOD 30 MIN: CPT | Performed by: OBSTETRICS & GYNECOLOGY

## 2025-01-07 NOTE — PROGRESS NOTES
Elza Jones is a 39 year old female  Patient's last menstrual period was 2024.   Chief Complaint   Patient presents with    Gyn Exam     NEW PATIENT, ANNUAL EXAM   Presenting for well woman exam. Last pap smear was normal 2021. Last mammogram was normal 2024 with repeat ordered from breast clinic. No concerns today.     OBSTETRICS HISTORY:  OB History    Para Term  AB Living   0 0 0 0 0 0   SAB IAB Ectopic Multiple Live Births   0 0 0 0 0       GYNE HISTORY:  Patient's last menstrual period was 2024.    History   Sexual Activity    Sexual activity: Not on file        Pap Result Notes: UNSURE OF LAST PAP      MEDICAL HISTORY:  Past Medical History:    Cognitive developmental delay    JDMS (juvenile dermatomyositis) ([facility])    age 5, now in remission         SURGICAL HISTORY:  Past Surgical History:   Procedure Laterality Date    Deep muscle biopsy      thigh    Egd N/A 10/21/2024    ; normal exam       SOCIAL HISTORY:  Social History     Socioeconomic History    Marital status: Single     Spouse name: Not on file    Number of children: Not on file    Years of education: Not on file    Highest education level: Not on file   Occupational History    Not on file   Tobacco Use    Smoking status: Never     Passive exposure: Never    Smokeless tobacco: Never   Vaping Use    Vaping status: Never Used   Substance and Sexual Activity    Alcohol use: Yes    Drug use: Never    Sexual activity: Not on file   Other Topics Concern    Not on file   Social History Narrative    Not on file     Social Drivers of Health     Financial Resource Strain: Not on file   Food Insecurity: No Food Insecurity (10/20/2024)    Food Insecurity     Food Insecurity: Never true   Transportation Needs: No Transportation Needs (10/20/2024)    Transportation Needs     Lack of Transportation: No     Car Seat: Not on file   Physical Activity: Not on file   Stress: Not on file   Social Connections: Not on  file   Housing Stability: Low Risk  (10/20/2024)    Housing Stability     Housing Instability: No     Housing Instability Emergency: Not on file     Crib or Bassinette: Not on file         Depression Screening (PHQ-2/PHQ-9): Over the LAST 2 WEEKS   Little interest or pleasure in doing things: Not at all    Feeling down, depressed, or hopeless: Not at all    PHQ-2 SCORE: 0           MEDICATIONS:    Current Outpatient Medications:     ergocalciferol 1.25 MG (75326 UT) Oral Cap, Take 1 capsule (50,000 Units total) by mouth once a week., Disp: 4 capsule, Rfl: 3    pantoprazole 40 MG Oral Tab EC, Take 1 tablet (40 mg total) by mouth every morning before breakfast., Disp: 90 tablet, Rfl: 3    ALLERGIES:  Allergies[1]      Review of Systems:  Review of Systems   All other systems reviewed and are negative.       Vitals:    01/07/25 1037   BP: 125/75   Pulse: 94       PHYSICAL EXAM:   Physical Exam  Vitals reviewed.   Constitutional:       Appearance: Normal appearance.   HENT:      Head: Atraumatic.   Eyes:      Pupils: Pupils are equal, round, and reactive to light.   Pulmonary:      Effort: Pulmonary effort is normal.   Chest:   Breasts:     Right: Normal. No bleeding, inverted nipple, mass, nipple discharge, skin change or tenderness.      Left: Normal. No bleeding, inverted nipple, mass, nipple discharge, skin change or tenderness.   Abdominal:      General: Abdomen is flat.      Palpations: Abdomen is soft.      Tenderness: There is no abdominal tenderness.   Genitourinary:     General: Normal vulva.      Exam position: Lithotomy position.      Labia:         Right: No rash, tenderness, lesion or injury.         Left: No rash, tenderness, lesion or injury.       Vagina: Normal.      Cervix: Normal.      Uterus: Normal. Not tender.       Adnexa: Right adnexa normal and left adnexa normal.        Right: No tenderness or fullness.          Left: No tenderness or fullness.     Lymphadenopathy:      Upper Body:      Right  upper body: No supraclavicular, axillary or pectoral adenopathy.      Left upper body: No supraclavicular, axillary or pectoral adenopathy.   Skin:     General: Skin is warm and dry.   Neurological:      General: No focal deficit present.      Mental Status: She is alert and oriented to person, place, and time.   Psychiatric:         Mood and Affect: Mood normal.         Behavior: Behavior normal.         Thought Content: Thought content normal.         Judgment: Judgment normal.           Assessment & Plan:  Elza was seen today for gyn exam.    Diagnoses and all orders for this visit:    Well woman exam with routine gynecological exam        Requested Prescriptions      No prescriptions requested or ordered in this encounter       Pap w/ HPV done. ASSCP guidelines reviewed.  Annual exams encouraged. Call if any abnormal vaginal bleeding.  Mammogram ordered.  SBE encouraged.             [1]   Allergies  Allergen Reactions    Amoxicillin HIVES

## 2025-01-08 LAB — HPV E6+E7 MRNA CVX QL NAA+PROBE: NEGATIVE

## 2025-02-04 ENCOUNTER — TELEPHONE (OUTPATIENT)
Facility: CLINIC | Age: 40
End: 2025-02-04

## 2025-02-04 PROBLEM — K64.9 HEMORRHOIDS: Status: ACTIVE | Noted: 2025-02-04

## 2025-02-04 PROBLEM — K57.90 DIVERTICULOSIS: Status: ACTIVE | Noted: 2025-02-04

## 2025-02-04 NOTE — TELEPHONE ENCOUNTER
Recall colonoscopy in 5 years per Dr Narayanan.    Colon done 2/4/2025    Health maintenance updated and message sent to patient outreach to repeat colonoscopy in 5 years

## 2025-02-21 DIAGNOSIS — E55.9 VITAMIN D DEFICIENCY: ICD-10-CM

## 2025-02-25 RX ORDER — ERGOCALCIFEROL 1.25 MG/1
50000 CAPSULE, LIQUID FILLED ORAL WEEKLY
Qty: 4 CAPSULE | Refills: 0 | OUTPATIENT
Start: 2025-02-25

## 2025-02-25 NOTE — TELEPHONE ENCOUNTER
torin Bertrand,     Your LDL cholesterol is still high but stable from previous. Your overall cardiovascular risk is low, so no medication is needed. However, try to reduce saturated fat in the diet.  Vitamin D is low. I have ordered a prescription strength vitamin D which you can take once a week for 12 weeks to replenish your stores. After the 12 weeks you can use an over the counter 3613-7381 international units of vitamin D daily.  The rest of your test results were within normal limits. Please call the office if you have any questions or concerns.     Best regards,  TRACE Orourke  For Dr. Geni Barron   Written by TRACE Orourke on 11/14/2024  9:38 AM CST  Seen by jade Jones on 11/20/2024  4:36 PM

## 2025-04-30 ENCOUNTER — OFFICE VISIT (OUTPATIENT)
Facility: CLINIC | Age: 40
End: 2025-04-30

## 2025-04-30 VITALS
WEIGHT: 122 LBS | DIASTOLIC BLOOD PRESSURE: 50 MMHG | HEIGHT: 62 IN | BODY MASS INDEX: 22.45 KG/M2 | HEART RATE: 80 BPM | SYSTOLIC BLOOD PRESSURE: 134 MMHG

## 2025-04-30 DIAGNOSIS — K21.00 GASTROESOPHAGEAL REFLUX DISEASE WITH ESOPHAGITIS WITHOUT HEMORRHAGE: Primary | ICD-10-CM

## 2025-04-30 PROCEDURE — 99214 OFFICE O/P EST MOD 30 MIN: CPT

## 2025-04-30 RX ORDER — PANTOPRAZOLE SODIUM 20 MG/1
20 TABLET, DELAYED RELEASE ORAL
Qty: 90 TABLET | Refills: 3 | Status: SHIPPED | OUTPATIENT
Start: 2025-04-30 | End: 2026-04-25

## 2025-04-30 NOTE — PROGRESS NOTES
[facility] - Gastroenterology                                                                                                                 Chief Complaint   Patient presents with    Follow - Up       HPI:   Elza Jones is a 39 year old year-old female with active diagnoses including juvenile dermatomyositis, cognitive developmental delay. Pt is accompanied by Dad, Rm, to clinic visit.    She is here today for hospital follow up.   Today:  #GERD - controlled   -since prior visit she is feeling well, taking pantoprazole 40mg daily. Symptoms have resolved. No return of dysphagia or nausea  -s/p colonoscopy 2/4/25, no polyps. 5 year recall 2/2 fam hx of advanced polyps  -she is looking forward to [employer] for special [employer] and going to a [employer] game this summer    Prior visit 10/30/24  #GERD  #dysphagia - resolved  -the past week she is taking pantoprazole daily and feeling improved. She is eating mostly soft foods. Denies dysphagia.   -admitted 10/20-10/21 for dysphagia. Normal EGD on 10/21, biopsy consistent with reflux changes, advised to continue PPI  -she is looking forward to special [employer] basketball and [employer]  -denies nausea    Prior visit 9/8/23  she is here today for evaluation of GERD.   -intermittent nausea for about 8 months  -intermittent bad taste in her mouth  -reports occasional abdominal cramping that gets better when she has a bowel movement  -Pt seen in ED on 7/31/23 for nausea & vomiting after drinking a beer. Pt discharged with ondansetron for nausea which has been helping.   -They saw Dr. Truong in June with vascular surgery and had CT and Mesenteric Artery Duplex Evaluation for median arcuate ligament syndrome. They were told by her office that nothing further was needed from vascular surgery standpoint.     Patient denies symptoms of vomiting, dyspepsia, dysphagia, odynophagia, globus  sensation, heartburn, hematemesis, change in bowel habits, constipation, diarrhea, hematochezia, or melena. she denies recent change in appetite, fever or unintentional weight loss.    she moves her bowels 1 times per day or every other day and without recent change. she denies straining and/or incomplete evacuation.      Last colonoscopy:   2/4/25 Dr. Narayanan - 5 year recall  -no polyps  -hemorrhoids    Last EGD: 10/21/24 Dr. Ayala  -normal appearing, biopsy consistent with reflux    A. Distal esophagus; biopsy:  Fragments of squamous mucosa with mild basal cell hyperplasia, spongiosis and focally prominent papillae, suggestive of reflux.  No evidence of dysplasia or carcinoma identified.     B. Proximal esophagus; biopsy:  Fragments of squamous mucosa with spongiosis, basal cell hyperplasia and focally prominent papillae, suggestive of reflux.  No evidence eosinophilic esophagitis, dysplasia or carcinoma identified.     NSAIDS: advil prn rare  Tobacco:none   Alcohol: 3 beers a week  Marijuana:none   Illicit drugs:none     No FH GI malignancy: none (Maternal grandma & Dad had polyps)    No history of adverse reaction to sedation  No HERMAN  No anticoagulants  No pacemaker/defibrillator  No pain medications and/or sleep aides    Wt Readings from Last 6 Encounters:   04/30/25 122 lb (55.3 kg)   01/27/25 119 lb (54 kg)   01/07/25 124 lb 3.2 oz (56.3 kg)   12/16/24 123 lb 9.6 oz (56.1 kg)   11/13/24 122 lb (55.3 kg)   10/30/24 119 lb (54 kg)        History, Medications, Allergies, ROS:      Past Medical History:    Cognitive developmental delay    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5, now in remission    Visual impairment    Glasses      Past Surgical History:   Procedure Laterality Date    Colonoscopy N/A 2/4/2025    Procedure: COLONOSCOPY;  Surgeon: Lon Narayanan MD;  Location: [facility] MAIN OR    Deep muscle biopsy  1991    thigh    Egd N/A 10/21/2024    ; normal exam      Family Hx:   Family History   Problem  Relation Age of Onset    Breast Cancer Mother         breast ca    Diabetes Father     Lipids Father     Depression Brother     Stroke Maternal Grandmother     Colon Cancer Neg     Ovarian Cancer Neg       Social History:   Social History     Socioeconomic History    Marital status: Single   Tobacco Use    Smoking status: Never     Passive exposure: Never    Smokeless tobacco: Never   Vaping Use    Vaping status: Never Used   Substance and Sexual Activity    Alcohol use: Not Currently    Drug use: Never     Social Drivers of Health     Food Insecurity: No Food Insecurity (10/20/2024)    Food Insecurity     Food Insecurity: Never true   Transportation Needs: No Transportation Needs (10/20/2024)    Transportation Needs     Lack of Transportation: No   Housing Stability: Low Risk  (10/20/2024)    Housing Stability     Housing Instability: No        Medications (Active prior to today's visit):  Current Outpatient Medications   Medication Sig Dispense Refill    pantoprazole 20 MG Oral Tab EC Take 1 tablet (20 mg total) by mouth every morning before breakfast. 90 tablet 3    ergocalciferol 1.25 MG (62592 UT) Oral Cap Take 1 capsule (50,000 Units total) by mouth once a week. (Patient not taking: Reported on 4/30/2025) 4 capsule 3       Allergies:  Allergies   Allergen Reactions    Amoxicillin HIVES       ROS:   CONSTITUTIONAL: negative for fevers, chills, sweats and weight loss  EYES Negative for scleral icterus or redness, and diplopia  HEENT: Negative for dysphagia and hoarseness  RESPIRATORY: Negative for cough and severe shortness of breath  CARDIOVASCULAR: Negative for crushing sub-sternal chest pain  GASTROINTESTINAL: See HPI  GENITOURINARY: Negative for dysuria  MUSCULOSKELETAL: Negative for arthralgias and myalgias  SKIN: Negative for jaundice, rash or pruritus  NEUROLOGICAL: Negative for dizziness and headaches  BEHAVIOR/PSYCH: Negative for psychotic behavior and poor appetite    PHYSICAL EXAM:   Blood pressure  134/50, pulse 80, height 5' 2\" (1.575 m), weight 122 lb (55.3 kg), last menstrual period 12/29/2024.    GEN: Alert, no acute distress, well-nourished   HEENT: anicteric sclera, neck supple, trachea midline, MMM, no palpable or tender neck or supraclavicular lymph nodes  CV: RRR, the extremities are warm and well perfused   LUNGS: No increased work of breathing, CTAB  ABDOMEN: Soft, symmetrical, non-tender without distention or guarding. No scars or lesions. Aorta is without bruit or visible pulsation. Umbilicus is midline without herniation. Normoactive bowel sounds are present, No masses, hepatomegaly or splenomegaly noted.  MSK: No erythema, no warmth, no swelling of joints  SKIN: No jaundice, no erythema, no rashes, no lesions  HEMATOLOGIC: No bleeding, no bruising  NEURO: Alert and interactive, MIRZA  PSYCH: appropriate mood & affect    Labs/Imaging/Procedures:     Patient's pertinent labs and imaging were reviewed and discussed with patient today.        .  ASSESSMENT/PLAN:   Elza Jones is a 39 year old year-old female with active diagnoses including juvenile dermatomyositis, cognitive developmental delay. Pt is accompanied by Dad, Rm, to clinic visit.    She is here today for hospital follow up   Today:  #GERD - controlled   -since prior visit she is feeling well, taking pantoprazole 40mg daily. Symptoms have resolved. No return of dysphagia or nausea  -s/p colonoscopy 2/4/25, no polyps. 5 year recall 2/2 fam hx of advanced polyps  -she is looking forward to bowling for special [employer] and going to a [employer] game this summer    -advised to reduce dose to 20mg pantoprazole. Follow up in 6 months or as needed. Repeat colonoscopy in 2030.       Recommendations:  -decrease pantoprazole to 20mg daily.     -follow up in 6 months       Orders This Visit:  No orders of the defined types were placed in this encounter.      Meds This Visit:  Requested Prescriptions     Signed Prescriptions Disp Refills     pantoprazole 20 MG Oral Tab EC 90 tablet 3     Sig: Take 1 tablet (20 mg total) by mouth every morning before breakfast.       Imaging & Referrals:  None      TRACE Pederson   [facility] Gastroenterology  4/30/2025          This note was partially prepared using Dragon Medical voice recognition dictation software. As a result, errors may occur. When identified, these errors have been corrected. While every attempt is made to correct errors during dictation, discrepancies may still exist.

## (undated) DEVICE — CONMED SCOPE SAVER BITE BLOCK, 20X27 MM: Brand: SCOPE SAVER

## (undated) DEVICE — KIT CLEAN ENDOKIT 1.1OZ GOWNX2

## (undated) DEVICE — V2 SPECIMEN COLLECTION MANIFOLD KIT: Brand: NEPTUNE

## (undated) DEVICE — YANKAUER,BULB TIP,W/O VENT,RIGID,STERILE: Brand: MEDLINE

## (undated) DEVICE — CO2 CANNULA,SSOFT,ADLT,7O2,4CO2,FEMALE: Brand: MEDLINE

## (undated) DEVICE — MEDI-VAC NON-CONDUCTIVE SUCTION TUBING: Brand: CARDINAL HEALTH

## (undated) DEVICE — MEDI-VAC NON-CONDUCTIVE SUCTION TUBING 6MM X 1.8M (6FT.) L: Brand: CARDINAL HEALTH

## (undated) DEVICE — SYRINGE, LUER SLIP, STERILE, 60ML: Brand: MEDLINE

## (undated) DEVICE — KIT ENDO ORCAPOD 160/180/190

## (undated) DEVICE — GIJAW SINGLE-USE BIOPSY FORCEPS WITH NEEDLE: Brand: GIJAW

## (undated) NOTE — Clinical Note
TCM assessment completed.  The patient is scheduled for a TCM/HFU with you on 10/29/2024.  Thank you.

## (undated) NOTE — LETTER
[facility]  Administration of Anesthesia  I, Elza Jones agree to be cared for by a physician anesthesiologist alone and/or with a nurse anesthetist, who is specially trained to monitor me and give me medicine to put me to sleep or keep me comfortable during my procedure    I understand that my anesthesiologist and/or anesthetist is not an employee or agent of [facility] or [employer]. He or she works for [facility]    As the patient asking for anesthesia services, I agree to:  Allow the anesthesiologist (anesthesia doctor) to give me medicine and do additional procedures as necessary. Some examples are: Starting or using an “IV” to give me medicine, fluids or blood during my procedure, and having a breathing tube placed to help me breathe when I’m asleep (intubation). In the event that my heart stops working properly, I understand that my anesthesiologist will make every effort to sustain my life, unless otherwise directed by [facility] Do Not Resuscitate documents.  Tell my anesthesia doctor before my procedure:  If I am pregnant.  The last time that I ate or drank.  iii. All of the medicines I take (including prescriptions, herbal supplements, and pills I can buy without a prescription (including street drugs/illegal medications). Failure to inform my anesthesiologist about these medicines may increase my risk of anesthetic complications.  iv.If I am allergic to anything or have had a reaction to anesthesia before.  I understand how the anesthesia medicine will help me (benefits).  I understand that with any type of anesthesia medicine there are risks:  The most common risks are: nausea, vomiting, sore throat, muscle soreness, damage to my eyes, mouth, or teeth (from breathing tube placement).  Rare risks include: remembering what happened during my procedure, allergic reactions to medications, injury to my airway, heart, lungs, vision, nerves, or  muscles and in extremely rare instances death.  My doctor has explained to me other choices available to me for my care (alternatives).  Pregnant Patients (“epidural”):  I understand that the risks of having an epidural (medicine given into my back to help control pain during labor), include itching, low blood pressure, difficulty urinating, headache or slowing of the baby’s heart. Very rare risks include infection, bleeding, seizure, irregular heart rhythms and nerve injury.  Regional Anesthesia (“spinal”, “epidural”, & “nerve blocks”):  I understand that rare but potential complications include headache, bleeding, infection, seizure, irregular heart rhythms, and nerve injury.    _____________________________________________________________________________  Patient (or Representative) Signature/Relationship to Patient  Date   Time    _____________________________________________________________________________   Name (if used)    Language/Organization   Time    _____________________________________________________________________________  Nurse Anesthetist Signature     Date   Time  _____________________________________________________________________________  Anesthesiologist Signature     Date   Time  I have discussed the procedure and information above with the patient (or patient’s representative) and answered their questions. The patient or their representative has agreed to have anesthesia services.    _____________________________________________________________________________  Witness        Date   Time  I have verified that the signature is that of the patient or patient’s representative, and that it was signed before the procedure  Patient Name: Elza Jones     : 1985                 Printed: 10/21/2024 at 8:20 AM    Medical Record #: [id number]                                            Page 1 of 1  ----------ANESTHESIA CONSENT----------

## (undated) NOTE — Clinical Note
[place] [address]  Authorization for Invasive Procedures  Date: ***           Time: ***    {[facility] ivs consent:39419}

## (undated) NOTE — MR AVS SNAPSHOT
After Visit Summary   7/9/2021    Wally Hanson    MRN: [id number]           Visit Information     Date & Time  7/9/2021 10:20 AM Provider  Page, Kenzie Villaseñor MD [address] [place] [address], [address] [place] [address],[address], [facility]/[employer].  Phone  [phone] non-emergency, consider your options before heading to an ER. VIDEO VISITS  Visit face-to-face with a [facility] physician or   SCOOTER using your mobile device or computer   using [employer].    e-VISITS  Communicate with a [facility] Physician or S[employer]OTER online.  The physician dutch

## (undated) NOTE — LETTER
[facility]  Administration of Anesthesia  I, Elza Jones agree to be cared for by a physician anesthesiologist alone and/or with a nurse anesthetist, who is specially trained to monitor me and give me medicine to put me to sleep or keep me comfortable during my procedure    I understand that my anesthesiologist and/or anesthetist is not an employee or agent of [facility] or [employer]. He or she works for [facility]    As the patient asking for anesthesia services, I agree to:  Allow the anesthesiologist (anesthesia doctor) to give me medicine and do additional procedures as necessary. Some examples are: Starting or using an “IV” to give me medicine, fluids or blood during my procedure, and having a breathing tube placed to help me breathe when I’m asleep (intubation). In the event that my heart stops working properly, I understand that my anesthesiologist will make every effort to sustain my life, unless otherwise directed by [facility] Do Not Resuscitate documents.  Tell my anesthesia doctor before my procedure:  If I am pregnant.  The last time that I ate or drank.  iii. All of the medicines I take (including prescriptions, herbal supplements, and pills I can buy without a prescription (including street drugs/illegal medications). Failure to inform my anesthesiologist about these medicines may increase my risk of anesthetic complications.  iv.If I am allergic to anything or have had a reaction to anesthesia before.  I understand how the anesthesia medicine will help me (benefits).  I understand that with any type of anesthesia medicine there are risks:  The most common risks are: nausea, vomiting, sore throat, muscle soreness, damage to my eyes, mouth, or teeth (from breathing tube placement).  Rare risks include: remembering what happened during my procedure, allergic reactions to medications, injury to my airway, heart, lungs, vision, nerves, or  muscles and in extremely rare instances death.  My doctor has explained to me other choices available to me for my care (alternatives).  Pregnant Patients (“epidural”):  I understand that the risks of having an epidural (medicine given into my back to help control pain during labor), include itching, low blood pressure, difficulty urinating, headache or slowing of the baby’s heart. Very rare risks include infection, bleeding, seizure, irregular heart rhythms and nerve injury.  Regional Anesthesia (“spinal”, “epidural”, & “nerve blocks”):  I understand that rare but potential complications include headache, bleeding, infection, seizure, irregular heart rhythms, and nerve injury.    _____________________________________________________________________________  Patient (or Representative) Signature/Relationship to Patient  Date   Time    _____________________________________________________________________________   Name (if used)    Language/Organization   Time    _____________________________________________________________________________  Nurse Anesthetist Signature     Date   Time  _____________________________________________________________________________  Anesthesiologist Signature     Date   Time  I have discussed the procedure and information above with the patient (or patient’s representative) and answered their questions. The patient or their representative has agreed to have anesthesia services.    _____________________________________________________________________________  Witness        Date   Time  I have verified that the signature is that of the patient or patient’s representative, and that it was signed before the procedure  Patient Name: Elza Jones     : 1985                 Printed: 10/20/2024 at 6:12 PM    Medical Record #: [id number]                                            Page 1 of 1  ----------ANESTHESIA CONSENT----------